# Patient Record
Sex: FEMALE | Race: BLACK OR AFRICAN AMERICAN | NOT HISPANIC OR LATINO | Employment: FULL TIME | URBAN - METROPOLITAN AREA
[De-identification: names, ages, dates, MRNs, and addresses within clinical notes are randomized per-mention and may not be internally consistent; named-entity substitution may affect disease eponyms.]

---

## 2023-08-18 ENCOUNTER — OFFICE VISIT (OUTPATIENT)
Dept: FAMILY MEDICINE | Facility: CLINIC | Age: 43
End: 2023-08-18
Payer: COMMERCIAL

## 2023-08-18 ENCOUNTER — TELEPHONE (OUTPATIENT)
Dept: FAMILY MEDICINE | Facility: CLINIC | Age: 43
End: 2023-08-18

## 2023-08-18 VITALS
TEMPERATURE: 98.2 F | SYSTOLIC BLOOD PRESSURE: 110 MMHG | WEIGHT: 210.6 LBS | HEART RATE: 92 BPM | HEIGHT: 63 IN | OXYGEN SATURATION: 100 % | RESPIRATION RATE: 16 BRPM | BODY MASS INDEX: 37.32 KG/M2 | DIASTOLIC BLOOD PRESSURE: 70 MMHG

## 2023-08-18 DIAGNOSIS — E66.01 MORBID OBESITY (H): ICD-10-CM

## 2023-08-18 DIAGNOSIS — Z13.1 SCREENING FOR DIABETES MELLITUS: ICD-10-CM

## 2023-08-18 DIAGNOSIS — J35.8 TONSIL STONE: ICD-10-CM

## 2023-08-18 DIAGNOSIS — G43.809 OTHER MIGRAINE WITHOUT STATUS MIGRAINOSUS, NOT INTRACTABLE: ICD-10-CM

## 2023-08-18 DIAGNOSIS — Z11.59 NEED FOR HEPATITIS C SCREENING TEST: ICD-10-CM

## 2023-08-18 DIAGNOSIS — Z12.31 VISIT FOR SCREENING MAMMOGRAM: ICD-10-CM

## 2023-08-18 DIAGNOSIS — Z00.00 ROUTINE HISTORY AND PHYSICAL EXAMINATION OF ADULT: Primary | ICD-10-CM

## 2023-08-18 DIAGNOSIS — Z11.4 SCREENING FOR HIV (HUMAN IMMUNODEFICIENCY VIRUS): ICD-10-CM

## 2023-08-18 DIAGNOSIS — Z12.4 CERVICAL CANCER SCREENING: ICD-10-CM

## 2023-08-18 LAB
ALBUMIN SERPL BCG-MCNC: 4.5 G/DL (ref 3.5–5.2)
ALP SERPL-CCNC: 72 U/L (ref 35–104)
ALT SERPL W P-5'-P-CCNC: 18 U/L (ref 0–50)
ANION GAP SERPL CALCULATED.3IONS-SCNC: 10 MMOL/L (ref 7–15)
AST SERPL W P-5'-P-CCNC: 18 U/L (ref 0–45)
BILIRUB SERPL-MCNC: 0.3 MG/DL
BUN SERPL-MCNC: 15.9 MG/DL (ref 6–20)
CALCIUM SERPL-MCNC: 9.4 MG/DL (ref 8.6–10)
CHLORIDE SERPL-SCNC: 107 MMOL/L (ref 98–107)
CHOLEST SERPL-MCNC: 205 MG/DL
CREAT SERPL-MCNC: 0.83 MG/DL (ref 0.51–0.95)
DEPRECATED HCO3 PLAS-SCNC: 22 MMOL/L (ref 22–29)
ERYTHROCYTE [DISTWIDTH] IN BLOOD BY AUTOMATED COUNT: 11.9 % (ref 10–15)
GFR SERPL CREATININE-BSD FRML MDRD: 90 ML/MIN/1.73M2
GLUCOSE SERPL-MCNC: 99 MG/DL (ref 70–99)
HBA1C MFR BLD: 5.3 % (ref 0–5.6)
HCT VFR BLD AUTO: 36.2 % (ref 35–47)
HDLC SERPL-MCNC: 72 MG/DL
HGB BLD-MCNC: 12.3 G/DL (ref 11.7–15.7)
LDLC SERPL CALC-MCNC: 113 MG/DL
MCH RBC QN AUTO: 33.2 PG (ref 26.5–33)
MCHC RBC AUTO-ENTMCNC: 34 G/DL (ref 31.5–36.5)
MCV RBC AUTO: 98 FL (ref 78–100)
NONHDLC SERPL-MCNC: 133 MG/DL
PLATELET # BLD AUTO: 260 10E3/UL (ref 150–450)
POTASSIUM SERPL-SCNC: 3.8 MMOL/L (ref 3.4–5.3)
PROT SERPL-MCNC: 6.9 G/DL (ref 6.4–8.3)
RBC # BLD AUTO: 3.71 10E6/UL (ref 3.8–5.2)
SODIUM SERPL-SCNC: 139 MMOL/L (ref 136–145)
TRIGL SERPL-MCNC: 98 MG/DL
TSH SERPL DL<=0.005 MIU/L-ACNC: 0.73 UIU/ML (ref 0.3–4.2)
WBC # BLD AUTO: 9.3 10E3/UL (ref 4–11)

## 2023-08-18 PROCEDURE — 80053 COMPREHEN METABOLIC PANEL: CPT | Performed by: FAMILY MEDICINE

## 2023-08-18 PROCEDURE — 87624 HPV HI-RISK TYP POOLED RSLT: CPT | Performed by: FAMILY MEDICINE

## 2023-08-18 PROCEDURE — 90715 TDAP VACCINE 7 YRS/> IM: CPT | Performed by: FAMILY MEDICINE

## 2023-08-18 PROCEDURE — 36415 COLL VENOUS BLD VENIPUNCTURE: CPT | Performed by: FAMILY MEDICINE

## 2023-08-18 PROCEDURE — 99214 OFFICE O/P EST MOD 30 MIN: CPT | Mod: 25 | Performed by: FAMILY MEDICINE

## 2023-08-18 PROCEDURE — 80061 LIPID PANEL: CPT | Performed by: FAMILY MEDICINE

## 2023-08-18 PROCEDURE — 84443 ASSAY THYROID STIM HORMONE: CPT | Performed by: FAMILY MEDICINE

## 2023-08-18 PROCEDURE — 87389 HIV-1 AG W/HIV-1&-2 AB AG IA: CPT | Performed by: FAMILY MEDICINE

## 2023-08-18 PROCEDURE — 83036 HEMOGLOBIN GLYCOSYLATED A1C: CPT | Performed by: FAMILY MEDICINE

## 2023-08-18 PROCEDURE — 90471 IMMUNIZATION ADMIN: CPT | Performed by: FAMILY MEDICINE

## 2023-08-18 PROCEDURE — 85027 COMPLETE CBC AUTOMATED: CPT | Performed by: FAMILY MEDICINE

## 2023-08-18 PROCEDURE — 99386 PREV VISIT NEW AGE 40-64: CPT | Mod: 25 | Performed by: FAMILY MEDICINE

## 2023-08-18 PROCEDURE — 86803 HEPATITIS C AB TEST: CPT | Performed by: FAMILY MEDICINE

## 2023-08-18 PROCEDURE — G0145 SCR C/V CYTO,THINLAYER,RESCR: HCPCS | Performed by: FAMILY MEDICINE

## 2023-08-18 RX ORDER — SEMAGLUTIDE 0.25 MG/.5ML
0.25 INJECTION, SOLUTION SUBCUTANEOUS WEEKLY
Qty: 2 ML | Refills: 0 | Status: SHIPPED | OUTPATIENT
Start: 2023-08-18 | End: 2023-09-15

## 2023-08-18 RX ORDER — SEMAGLUTIDE 1 MG/.5ML
1 INJECTION, SOLUTION SUBCUTANEOUS WEEKLY
Qty: 6 ML | Refills: 3 | Status: SHIPPED | OUTPATIENT
Start: 2023-10-17 | End: 2023-11-30

## 2023-08-18 RX ORDER — SUMATRIPTAN 25 MG/1
25 TABLET, FILM COATED ORAL
Qty: 30 TABLET | Refills: 1 | Status: SHIPPED | OUTPATIENT
Start: 2023-08-18 | End: 2024-05-20

## 2023-08-18 RX ORDER — SEMAGLUTIDE 0.5 MG/.5ML
0.5 INJECTION, SOLUTION SUBCUTANEOUS WEEKLY
Qty: 2 ML | Refills: 0 | Status: SHIPPED | OUTPATIENT
Start: 2023-09-15 | End: 2023-10-13

## 2023-08-18 RX ORDER — TOPIRAMATE 25 MG/1
25 TABLET, FILM COATED ORAL DAILY
Qty: 60 TABLET | Refills: 1 | Status: SHIPPED | OUTPATIENT
Start: 2023-08-18 | End: 2023-11-30

## 2023-08-18 ASSESSMENT — ENCOUNTER SYMPTOMS
FEVER: 0
DIZZINESS: 0
BREAST MASS: 0
ARTHRALGIAS: 0
PALPITATIONS: 0
CONSTIPATION: 0
HEARTBURN: 0
FREQUENCY: 0
HEMATOCHEZIA: 0
PARESTHESIAS: 0
WEAKNESS: 0
SORE THROAT: 0
ABDOMINAL PAIN: 0
CHILLS: 0
HEMATURIA: 0
DYSURIA: 0
NERVOUS/ANXIOUS: 1
JOINT SWELLING: 0
NAUSEA: 0
SHORTNESS OF BREATH: 0
EYE PAIN: 0
MYALGIAS: 0
DIARRHEA: 0
HEADACHES: 1
COUGH: 0

## 2023-08-18 ASSESSMENT — PATIENT HEALTH QUESTIONNAIRE - PHQ9
SUM OF ALL RESPONSES TO PHQ QUESTIONS 1-9: 17
10. IF YOU CHECKED OFF ANY PROBLEMS, HOW DIFFICULT HAVE THESE PROBLEMS MADE IT FOR YOU TO DO YOUR WORK, TAKE CARE OF THINGS AT HOME, OR GET ALONG WITH OTHER PEOPLE: VERY DIFFICULT
SUM OF ALL RESPONSES TO PHQ QUESTIONS 1-9: 17

## 2023-08-18 ASSESSMENT — PAIN SCALES - GENERAL: PAINLEVEL: NO PAIN (0)

## 2023-08-18 NOTE — PROGRESS NOTES
SUBJECTIVE:   CC: John is an 43 year old who presents for preventive health visit.       8/18/2023     1:05 PM   Additional Questions   Roomed by Milena Walker MA   Accompanied by self         8/18/2023     1:05 PM   Patient Reported Additional Medications   Patient reports taking the following new medications none       Healthy Habits:     Getting at least 3 servings of Calcium per day:  Yes    Bi-annual eye exam:  NO    Dental care twice a year:  Yes    Sleep apnea or symptoms of sleep apnea:  Daytime drowsiness, Excessive snoring and Sleep apnea    Diet:  Low salt, Low fat/cholesterol and Carbohydrate counting    Frequency of exercise:  2-3 days/week    Duration of exercise:  45-60 minutes    Taking medications regularly:  Yes    Medication side effects:  None    Additional concerns today:  Yes    ENT referral: Has a history of tonsil stones.      Today's PHQ-9 Score:       8/18/2023    10:17 AM   PHQ-9 SCORE   PHQ-9 Total Score MyChart 17 (Moderately severe depression)   PHQ-9 Total Score 17         Have you ever done Advance Care Planning? (For example, a Health Directive, POLST, or a discussion with a medical provider or your loved ones about your wishes): No, advance care planning information given to patient to review.  Advanced care planning was discussed at today's visit.    Social History     Tobacco Use    Smoking status: Never    Smokeless tobacco: Never   Substance Use Topics    Alcohol use: Yes     Comment: In a month, I might have drinks anywhere from 0 to 4 times.             8/18/2023    10:15 AM   Alcohol Use   Prescreen: >3 drinks/day or >7 drinks/week? No          No data to display              Reviewed orders with patient.  Reviewed health maintenance and updated orders accordingly - Yes  Lab work is in process  Labs reviewed in EPIC  BP Readings from Last 3 Encounters:   08/18/23 110/70    Wt Readings from Last 3 Encounters:   08/18/23 95.5 kg (210 lb 9.6 oz)                  There  is no problem list on file for this patient.    No past surgical history on file.    Social History     Tobacco Use    Smoking status: Never    Smokeless tobacco: Never   Substance Use Topics    Alcohol use: Yes     Comment: In a month, I might have drinks anywhere from 0 to 4 times.     Family History   Problem Relation Age of Onset    Other Cancer Mother         Skin cancer    Depression Mother     Anxiety Disorder Mother     Mental Illness Mother     Diabetes Father     Hypertension Father     Depression Father     Anxiety Disorder Father     Mental Illness Father     Obesity Father     Breast Cancer Maternal Grandmother     Mental Illness Maternal Grandmother     Diabetes Paternal Grandmother     Hypertension Paternal Grandmother     Anxiety Disorder Paternal Grandmother     Obesity Paternal Grandmother     Depression Brother     Anxiety Disorder Brother     Mental Illness Brother          No current outpatient medications on file.     Allergies   Allergen Reactions    Seasonal Allergies Difficulty breathing and Other (See Comments)     No lab results found.     Breast Cancer Screenin/18/2023    10:24 AM   Breast CA Risk Assessment (FHS-7)   Do you have a family history of breast, colon, or ovarian cancer? No / Unknown       click delete button to remove this line now    Pertinent mammograms are reviewed under the imaging tab.    History of Pap smear: Last 3 Pap Results: No results found for: PAP     Reviewed and updated as needed this visit by clinical staff   Tobacco  Allergies  Meds              Reviewed and updated as needed this visit by Provider                     Review of Systems   Constitutional:  Negative for chills and fever.   HENT:  Negative for congestion, ear pain, hearing loss and sore throat.    Eyes:  Negative for pain and visual disturbance.   Respiratory:  Negative for cough and shortness of breath.    Cardiovascular:  Negative for chest pain, palpitations and peripheral edema.  "  Gastrointestinal:  Negative for abdominal pain, constipation, diarrhea, heartburn, hematochezia and nausea.   Endocrine: Negative for cold intolerance and heat intolerance.   Breasts:  Negative for tenderness, breast mass and discharge.   Genitourinary:  Negative for dysuria, frequency, genital sores, hematuria, pelvic pain, urgency, vaginal bleeding and vaginal discharge.   Musculoskeletal:  Negative for arthralgias, joint swelling and myalgias.   Skin:  Negative for rash.   Allergic/Immunologic: Negative for environmental allergies and food allergies.   Neurological:  Positive for headaches. Negative for dizziness, weakness and paresthesias.   Psychiatric/Behavioral:  Positive for mood changes. The patient is nervous/anxious.           OBJECTIVE:   /70 (BP Location: Right arm, Patient Position: Sitting, Cuff Size: Adult Large)   Pulse 92   Temp 98.2  F (36.8  C) (Tympanic)   Resp 16   Ht 1.594 m (5' 2.75\")   Wt 95.5 kg (210 lb 9.6 oz)   LMP 08/08/2023 (Exact Date)   SpO2 100%   Breastfeeding Unknown   BMI 37.60 kg/m    Physical Exam  GENERAL: healthy, alert, and no distress  EYES: Eyes grossly normal to inspection, PERRL and conjunctivae and sclerae normal  HENT: normal cephalic/atraumatic, nose and mouth without ulcers or lesions, oropharynx clear, and oral mucous membranes moist  NECK: no adenopathy, no asymmetry, masses, or scars and thyroid normal to palpation  RESP: lungs clear to auscultation - no rales, rhonchi or wheezes  CV: regular rate and rhythm, normal S1 S2, no S3 or S4, no murmur, click or rub, no peripheral edema and peripheral pulses strong  ABDOMEN: soft, nontender, no hepatosplenomegaly, no masses and bowel sounds normal   (female): normal female external genitalia, normal urethral meatus, vaginal mucosa, normal cervix/adnexa/uterus without masses or discharge  MS: no gross musculoskeletal defects noted, no edema  SKIN: no suspicious lesions or rashes  NEURO: Normal strength " and tone, mentation intact and speech normal      ASSESSMENT/PLAN:   (Z00.00) Routine history and physical examination of adult  (primary encounter diagnosis)  Comment: Known to have migraine, tension headache, having more flareups in the recent past.  Management options discussed.  Topiramate and Imitrex prescribed.  Recommended regular exercise, healthy diet and weight loss.  Patient would like to try Wegovy for weight loss, common side effects discussed.  Follow-up in 3 months or earlier if needed  Plan: Lipid panel            (Z11.4) Screening for HIV (human immunodeficiency virus)  Comment:   Plan: HIV Antigen Antibody Combo            (Z11.59) Need for hepatitis C screening test  Comment:   Plan: Hepatitis C Screen Reflex to HCV RNA Quant and         Genotype            (Z12.4) Cervical cancer screening  Comment:   Plan: Pap Screen with HPV - recommended age 30 - 65         years           (G43.809) Other migraine without status migrainosus, not intractable  Comment: As above  Plan: CBC with platelets, Comprehensive metabolic         panel (BMP + Alb, Alk Phos, ALT, AST, Total.         Bili, TP), TSH with free T4 reflex, topiramate         (TOPAMAX) 25 MG tablet, SUMAtriptan (IMITREX)         25 MG tablet            (Z12.31) Visit for screening mammogram  Comment:   Plan: *MA Screening Digital Bilateral            (Z13.1) Screening for diabetes mellitus  Comment:   Plan: Hemoglobin A1c            (J35.8) Tonsil stone  Comment:   Plan: Adult ENT  Referral            (E66.01) Morbid obesity (H)  Comment: Healthy lifestyle modifications stressed including regular exercise, balanced diet, weight loss and limiting salt/caffeine/pop intake.  Wegovy prescribed  Plan: Semaglutide-Weight Management (WEGOVY) 0.25         MG/0.5ML pen, Semaglutide-Weight Management         (WEGOVY) 0.5 MG/0.5ML pen, Semaglutide-Weight         Management (WEGOVY) 1 MG/0.5ML pen, insulin pen        needle (32G X 4 MM) 32G X 4 MM  "miscellaneous            Depression Screening Follow Up        8/18/2023    10:17 AM   PHQ   PHQ-9 Total Score 17   Q9: Thoughts of better off dead/self-harm past 2 weeks Not at all       Follow Up Actions Taken  Crisis resource information provided in After Visit Summary       COUNSELING:  Reviewed preventive health counseling, as reflected in patient instructions      BMI:   Estimated body mass index is 37.6 kg/m  as calculated from the following:    Height as of this encounter: 1.594 m (5' 2.75\").    Weight as of this encounter: 95.5 kg (210 lb 9.6 oz).   Weight management plan: Discussed healthy diet and exercise guidelines      She reports that she has never smoked. She has never used smokeless tobacco.              Vikram Perrin MD  [facility]  "

## 2023-08-18 NOTE — TELEPHONE ENCOUNTER
.Prior Authorization Retail Medication Request    Medication/Dose: Wegovy 0.25mg/0.5ml    ICD code (if different than what is on RX):    Previously Tried and Failed:    Rationale:      Insurance Name: [employer]   Insurance ID: [id number]       Pharmacy Information (if different than what is on RX)  Name:    Phone:     Thank You,   Freda Narayanan [facility] [place]

## 2023-08-19 LAB
HCV AB SERPL QL IA: NONREACTIVE
HIV 1+2 AB+HIV1 P24 AG SERPL QL IA: NONREACTIVE

## 2023-08-22 NOTE — TELEPHONE ENCOUNTER
Prior Authorization Approval    Medication: WEGOVY 0.25 MG/0.5ML SC SOAJ  Authorization Effective Date: 8/22/2023  Authorization Expiration Date: 3/19/2024  Approved Dose/Quantity: 2/28  Reference #: [id number]   Insurance Company: [employer] - Phone [phone] Fax [phone]  Expected CoPay:       CoPay Card Available:      Financial Assistance Needed:   Which Pharmacy is filling the prescription: [place], [place] - [address] [place] [address]  Pharmacy Notified: Yes  Patient Notified: Yes

## 2023-08-22 NOTE — TELEPHONE ENCOUNTER
PA Initiation    Medication: WEGOVY 0.25 MG/0.5ML SC SOAJ  Insurance Company: [employer] - Phone [phone] Fax [phone]  Pharmacy Filling the Rx: [place], [place] - [address] [place] [address]  Filling Pharmacy Phone: [phone]  Filling Pharmacy Fax:    Start Date: 8/22/2023

## 2023-08-23 LAB
BKR LAB AP GYN ADEQUACY: NORMAL
BKR LAB AP GYN INTERPRETATION: NORMAL
BKR LAB AP HPV REFLEX: NORMAL
BKR LAB AP PREVIOUS ABNORMAL: NORMAL
PATH REPORT.COMMENTS IMP SPEC: NORMAL
PATH REPORT.COMMENTS IMP SPEC: NORMAL
PATH REPORT.RELEVANT HX SPEC: NORMAL

## 2023-08-25 LAB
HUMAN PAPILLOMA VIRUS 16 DNA: NEGATIVE
HUMAN PAPILLOMA VIRUS 18 DNA: NEGATIVE
HUMAN PAPILLOMA VIRUS FINAL DIAGNOSIS: NORMAL
HUMAN PAPILLOMA VIRUS OTHER HR: NEGATIVE

## 2023-11-30 ENCOUNTER — E-VISIT (OUTPATIENT)
Dept: FAMILY MEDICINE | Facility: CLINIC | Age: 43
End: 2023-11-30
Payer: COMMERCIAL

## 2023-11-30 DIAGNOSIS — G43.809 OTHER MIGRAINE WITHOUT STATUS MIGRAINOSUS, NOT INTRACTABLE: ICD-10-CM

## 2023-11-30 DIAGNOSIS — E66.01 MORBID OBESITY (H): Primary | ICD-10-CM

## 2023-11-30 PROCEDURE — 99421 OL DIG E/M SVC 5-10 MIN: CPT | Performed by: FAMILY MEDICINE

## 2023-12-01 ENCOUNTER — ALLIED HEALTH/NURSE VISIT (OUTPATIENT)
Dept: FAMILY MEDICINE | Facility: CLINIC | Age: 43
End: 2023-12-01
Payer: COMMERCIAL

## 2023-12-01 DIAGNOSIS — Z23 NEED FOR PROPHYLACTIC VACCINATION AND INOCULATION AGAINST INFLUENZA: Primary | ICD-10-CM

## 2023-12-01 PROCEDURE — 90686 IIV4 VACC NO PRSV 0.5 ML IM: CPT

## 2023-12-01 PROCEDURE — 99207 PR NO CHARGE NURSE ONLY: CPT

## 2023-12-01 PROCEDURE — 90715 TDAP VACCINE 7 YRS/> IM: CPT

## 2023-12-01 PROCEDURE — 90471 IMMUNIZATION ADMIN: CPT

## 2023-12-01 PROCEDURE — 90472 IMMUNIZATION ADMIN EACH ADD: CPT

## 2023-12-01 NOTE — PROGRESS NOTES
Prior to immunization administration, verified patients identity using patient s name and date of birth. Please see Immunization Activity for additional information.     Screening Questionnaire for Adult Immunization    Are you sick today?   No   Do you have allergies to medications, food, a vaccine component or latex?   No   Have you ever had a serious reaction after receiving a vaccination?   No   Do you have a long-term health problem with heart, lung, kidney, or metabolic disease (e.g., diabetes), asthma, a blood disorder, no spleen, complement component deficiency, a cochlear implant, or a spinal fluid leak?  Are you on long-term aspirin therapy?   No   Do you have cancer, leukemia, HIV/AIDS, or any other immune system problem?   No   Do you have a parent, brother, or sister with an immune system problem?   No   In the past 3 months, have you taken medications that affect  your immune system, such as prednisone, other steroids, or anticancer drugs; drugs for the treatment of rheumatoid arthritis, Crohn s disease, or psoriasis; or have you had radiation treatments?   No   Have you had a seizure, or a brain or other nervous system problem?   No   During the past year, have you received a transfusion of blood or blood    products, or been given immune (gamma) globulin or antiviral drug?   No   For women: Are you pregnant or is there a chance you could become       pregnant during the next month?   No   Have you received any vaccinations in the past 4 weeks?   No     Immunization questionnaire answers were all negative.    I have reviewed the following standing orders:   This patient is due and qualifies for the Influenza vaccine.    Click here for Influenza Vaccine Standing Order    I have reviewed the vaccines inclusion and exclusion criteria; No concerns regarding eligibility.         This patient is due and qualifies for a TDAP vaccine.    Click here for Tdap Standing Order    I have reviewed the vaccines  inclusion and exclusion criteria; No concerns regarding eligibility.     Patient instructed to remain in clinic for 15 minutes afterwards, and to report any adverse reactions.     Screening performed by Delma Pink on 12/1/2023 at 3:59 PM.

## 2023-12-19 ENCOUNTER — VIRTUAL VISIT (OUTPATIENT)
Dept: FAMILY MEDICINE | Facility: CLINIC | Age: 43
End: 2023-12-19
Payer: COMMERCIAL

## 2023-12-19 DIAGNOSIS — G43.809 OTHER MIGRAINE WITHOUT STATUS MIGRAINOSUS, NOT INTRACTABLE: Primary | ICD-10-CM

## 2023-12-19 DIAGNOSIS — E66.01 MORBID OBESITY (H): ICD-10-CM

## 2023-12-19 PROCEDURE — 99214 OFFICE O/P EST MOD 30 MIN: CPT | Mod: VID | Performed by: FAMILY MEDICINE

## 2023-12-19 RX ORDER — METOPROLOL TARTRATE 25 MG/1
25 TABLET, FILM COATED ORAL 2 TIMES DAILY
Qty: 120 TABLET | Refills: 1 | Status: SHIPPED | OUTPATIENT
Start: 2023-12-19 | End: 2024-09-27

## 2023-12-19 NOTE — PROGRESS NOTES
"John is a 43 year old who is being evaluated via a billable video visit.      How would you like to obtain your AVS? MyChart  If the video visit is dropped, the invitation should be resent by: Text to cell phone: [phone]  Will anyone else be joining your video visit? No        Assessment & Plan     Morbid obesity (H)  Patient lost almost 27 pounds weight since started Wegovy.  Recommended to continue regular exercise, healthy diet and Wegovy prescription refilled  - Semaglutide-Weight Management (WEGOVY) 1.7 MG/0.75ML pen; Inject 1.7 mg Subcutaneous once a week    Other migraine without status migrainosus, not intractable  Patient stopped taking topiramate due to side effects.  Alternative options discussed.  Metoprolol prescribed and recommended to schedule appointment with neurologist as well  - metoprolol tartrate (LOPRESSOR) 25 MG tablet; Take 1 tablet (25 mg) by mouth 2 times daily      Vikram Perrin MD  [facility]    Subjective   John is a 43 year old, presenting for the following health issues:  Migraine      History of Present Illness       Headaches:   Since the patient's last clinic visit, headaches are: worsened  The patient is getting headaches:  15-18 days out of the month  She is not able to do normal daily activities when she has a migraine.  The patient is taking the following rescue/relief medications:  Tylenol, Excedrin and sumatriptan (Imitrex)   Patient states \"I get some relief\" from the rescue/relief medications.   The patient is taking the following medications to prevent migraines:  No medications to prevent migraines  In the past 4 weeks, the patient has gone to an Urgent Care or Emergency Room 1 time times due to headaches.    Reason for visit:  Follow up from August 2023 visit, where I started 3 new medications    She eats 2-3 servings of fruits and vegetables daily.She consumes 2 sweetened beverage(s) daily.She exercises with enough effort to " increase her heart rate 30 to 60 minutes per day.  She exercises with enough effort to increase her heart rate 4 days per week.   She is taking medications regularly.         Review of Systems   Constitutional, HEENT, cardiovascular, pulmonary, GI, , musculoskeletal, neuro, skin, endocrine and psych systems are negative, except as otherwise noted.      Objective           Vitals:  No vitals were obtained today due to virtual visit.    Physical Exam   GENERAL: Healthy, alert and no distress  EYES: Eyes grossly normal to inspection.  No discharge or erythema, or obvious scleral/conjunctival abnormalities.  RESP: No audible wheeze, cough, or visible cyanosis.  No visible retractions or increased work of breathing.    SKIN: Visible skin clear. No significant rash, abnormal pigmentation or lesions.  NEURO: Cranial nerves grossly intact.  Mentation and speech appropriate for age.  PSYCH: Mentation appears normal, affect normal/bright, judgement and insight intact, normal speech and appearance well-groomed.    Wt Readings from Last 10 Encounters:   08/18/23 95.5 kg (210 lb 9.6 oz)            Video-Visit Details    Type of service:  Video Visit     Originating Location (pt. Location): Home    Distant Location (provider location):  On-site  Platform used for Video Visit: [employer]

## 2023-12-19 NOTE — COMMUNITY RESOURCES LIST (ENGLISH)
12/19/2023   [facility] Guest of a Guest  N/A  For questions about this resource list or additional care needs, please contact your primary care clinic or care manager.  Phone: [phone]   Email: N/A   Address: [address] [place] [address]   Hours: N/A        Financial Stability       Rent and mortgage payment assistance  1  [place] [employer] COVID-19 Housing Assistance - Housing Help Distance: 0.13 miles      Phone/Virtual   [address] [place] [address]  Language: English  Hours: Mon - Sun Open 24 Hours  Fees: Free   Phone: (481) 472-3477 Email: info@housinglink.org Website: https://housinghelpmn.org/     2  [employer] - [place] Distance: 0.13 miles      Phone/Virtual   [address] [place] [address]  Language: English  Hours: Mon - Thu 9:00 AM - 5:00 PM  Fees: Free   Phone: (904) 796-2971 Email: general.questions@[employer] Website: https://www.[employer]     Utility payment assistance  3  [employer] - [place] Distance: 0.13 miles      Phone/Virtual   [address] [place] [address]  Language: English  Hours: Mon - Thu 9:00 AM - 5:00 PM  Fees: Free   Phone: (133) 920-2457 Email: general.questions@[employer] Website: https://www.[employer]     4  [employer] - Payment Plan Credit Program Distance: 0.28 miles      Phone/Virtual   [address] [place] [address]  Language: English, Malawian  Hours: Mon - Fri 7:00 AM - 7:00 PM  Fees: Free   Phone: (477) 824-8435 Email: inquire@[employer] Website: https://www.[employer]/          Important Numbers & Websites       Emergency Services   911  City Services   311  Poison Control   (647) 567-5490  Suicide Prevention Lifeline   (563) 836-6351 (TALK)  Child Abuse Hotline   (395) 574-9661 (4-A-Child)  Sexual Assault Hotline   (172) 461-2398 (HOPE)  National Runaway Safeline   (485) 173-6477 (RUNAWAY)  All-Options Talkline   (344) 657-1867  Substance Abuse Referral   (893) 281-6202 (HELP)     DISPLAY PLAN FREE TEXT

## 2023-12-27 ENCOUNTER — OFFICE VISIT (OUTPATIENT)
Dept: OTOLARYNGOLOGY | Facility: CLINIC | Age: 43
End: 2023-12-27
Payer: COMMERCIAL

## 2023-12-27 VITALS — DIASTOLIC BLOOD PRESSURE: 84 MMHG | HEART RATE: 87 BPM | SYSTOLIC BLOOD PRESSURE: 129 MMHG

## 2023-12-27 DIAGNOSIS — J35.8 TONSIL STONE: ICD-10-CM

## 2023-12-27 DIAGNOSIS — J35.01 CHRONIC TONSILLITIS: Primary | ICD-10-CM

## 2023-12-27 PROCEDURE — 99204 OFFICE O/P NEW MOD 45 MIN: CPT | Performed by: OTOLARYNGOLOGY

## 2023-12-27 RX ORDER — LANOLIN ALCOHOL/MO/W.PET/CERES
3 CREAM (GRAM) TOPICAL
COMMUNITY

## 2023-12-27 RX ORDER — VITAMIN B COMPLEX
TABLET ORAL DAILY
COMMUNITY

## 2023-12-27 ASSESSMENT — ENCOUNTER SYMPTOMS
STRIDOR: 0
GASTROINTESTINAL NEGATIVE: 1
CONSTITUTIONAL NEGATIVE: 1
TINGLING: 0
DIZZINESS: 0
SORE THROAT: 1
BRUISES/BLEEDS EASILY: 0
HEADACHES: 0
RESPIRATORY NEGATIVE: 1
EYES NEGATIVE: 1
SINUS PAIN: 0

## 2023-12-27 NOTE — PROGRESS NOTES
John Preciado's chief complaint for this visit includes:  Chief Complaint   Patient presents with    Consult     Tonsil stones for several years. With bad breath     PCP: Vikram Perrin    Referring Provider:  Vikram Perrin [place]  [address] [place] [address]    /84   Pulse 87   LMP  (LMP Unknown)

## 2023-12-27 NOTE — LETTER
12/27/2023         RE: John Preciado  [address]  [place] [address]        Dear Colleague,    Thank you for referring your patient, John Preciado, to the [facility]. Please see a copy of my visit note below.    John Preciado's chief complaint for this visit includes:  Chief Complaint   Patient presents with     Consult     Tonsil stones for several years. With bad breath     PCP: Vikram Perrin    Referring Provider:  Vikram Perrin MD  [address] [place] [address]    /84   Pulse 87   LMP  (LMP Unknown)             HPI    This pleasant 42 y/o patient is having tonsil stones, bad breath and smell for years. Describes post nasal drainage. Denies any fever, dysphagia, odynophagia, or voice changes. Describes sore throat from time to time. She has a hx of recurrent tonsillitis during her childhood. No known environmental allergies.    Current Medications:  insulin pen needle (32G X 4 MM) 32G X 4 MM miscellaneous  melatonin 3 MG tablet  metoprolol tartrate (LOPRESSOR) 25 MG tablet  Semaglutide-Weight Management (WEGOVY) 1.7 MG/0.75ML pen  SUMAtriptan (IMITREX) 25 MG tablet  Vitamin D3 (VITAMIN D, CHOLECALCIFEROL,) 25 mcg (1000 units) tablet         Review of Systems   Constitutional: Negative.    HENT:  Positive for sore throat. Negative for ear discharge, ear pain, hearing loss, nosebleeds, sinus pain and tinnitus.    Eyes: Negative.    Respiratory: Negative.  Negative for stridor.    Gastrointestinal: Negative.    Skin: Negative.    Neurological:  Negative for dizziness, tingling and headaches.   Endo/Heme/Allergies:  Positive for environmental allergies. Does not bruise/bleed easily.         Physical Exam  Vitals and nursing note reviewed.   Constitutional:       Appearance: Normal appearance.   HENT:      Head: Normocephalic and atraumatic.      Right Ear: Tympanic membrane, ear canal and external ear normal. No middle ear  effusion.      Left Ear: Tympanic membrane, ear canal and external ear normal.  No middle ear effusion.      Nose: Nose normal. No mucosal edema, congestion or rhinorrhea.      Mouth/Throat:      Mouth: Mucous membranes are moist.      Pharynx: Oropharynx is clear. Uvula midline. No uvula swelling.      Tonsils: No tonsillar abscesses. 2+ on the right. 2+ on the left.   Eyes:      Extraocular Movements: Extraocular movements intact.      Pupils: Pupils are equal, round, and reactive to light.   Neurological:      Mental Status: She is alert.         A/P  42 y/o with bilateral tonsil stones and bad breath and smell is seen. Options including mouth washes, pharyngeal gargles, bilateral tonsillectomy were discussed. Pros, cons, alternatives and complications were discussed. Patient's questions were answered. Arrangements will be done according to her wishes.      Again, thank you for allowing me to participate in the care of your patient.        Sincerely,        Sara Chapman MD

## 2023-12-27 NOTE — NURSING NOTE
John Preciado's chief complaint for this visit includes:  Chief Complaint   Patient presents with    Consult     Tonsil stones for several years. With bad breath     PCP: Vikram Perrin    Referring Provider:  Vikram Perrin MD  [address] [place] [address]    /84   Pulse 87   LMP  (LMP Unknown)

## 2023-12-27 NOTE — PROGRESS NOTES
HPI    This pleasant 44 y/o patient is having tonsil stones, bad breath and smell for years. Describes post nasal drainage. Denies any fever, dysphagia, odynophagia, or voice changes. Describes sore throat from time to time. She has a hx of recurrent tonsillitis during her childhood. No known environmental allergies.    Current Medications:  insulin pen needle (32G X 4 MM) 32G X 4 MM miscellaneous  melatonin 3 MG tablet  metoprolol tartrate (LOPRESSOR) 25 MG tablet  Semaglutide-Weight Management (WEGOVY) 1.7 MG/0.75ML pen  SUMAtriptan (IMITREX) 25 MG tablet  Vitamin D3 (VITAMIN D, CHOLECALCIFEROL,) 25 mcg (1000 units) tablet         Review of Systems   Constitutional: Negative.    HENT:  Positive for sore throat. Negative for ear discharge, ear pain, hearing loss, nosebleeds, sinus pain and tinnitus.    Eyes: Negative.    Respiratory: Negative.  Negative for stridor.    Gastrointestinal: Negative.    Skin: Negative.    Neurological:  Negative for dizziness, tingling and headaches.   Endo/Heme/Allergies:  Positive for environmental allergies. Does not bruise/bleed easily.         Physical Exam  Vitals and nursing note reviewed.   Constitutional:       Appearance: Normal appearance.   HENT:      Head: Normocephalic and atraumatic.      Right Ear: Tympanic membrane, ear canal and external ear normal. No middle ear effusion.      Left Ear: Tympanic membrane, ear canal and external ear normal.  No middle ear effusion.      Nose: Nose normal. No mucosal edema, congestion or rhinorrhea.      Mouth/Throat:      Mouth: Mucous membranes are moist.      Pharynx: Oropharynx is clear. Uvula midline. No uvula swelling.      Tonsils: No tonsillar abscesses. 2+ on the right. 2+ on the left.   Eyes:      Extraocular Movements: Extraocular movements intact.      Pupils: Pupils are equal, round, and reactive to light.   Neurological:      Mental Status: She is alert.         A/P  44 y/o with bilateral tonsil stones and bad breath and  smell is seen. Options including mouth washes, pharyngeal gargles, bilateral tonsillectomy were discussed. Pros, cons, alternatives and complications were discussed. Patient's questions were answered. Arrangements will be done according to her wishes.

## 2023-12-28 ENCOUNTER — TELEPHONE (OUTPATIENT)
Dept: OTOLARYNGOLOGY | Facility: CLINIC | Age: 43
End: 2023-12-28
Payer: COMMERCIAL

## 2023-12-28 NOTE — TELEPHONE ENCOUNTER
Left voicemail for patient regarding scheduling surgery with Dr. Chapman.    Provided contact number to discuss.    P: [phone]    __    Sandy Cardona, on 12/28/2023 at 1:47 PM

## 2024-01-08 NOTE — TELEPHONE ENCOUNTER
Left voicemail for patient regarding scheduling surgery with Dr. Chapman.    Provided contact number to discuss.    P: [phone]    __    Sandy Cardona, on 1/8/2024 at 12:02 PM

## 2024-01-16 NOTE — TELEPHONE ENCOUNTER
Left voicemail for patient regarding scheduling surgery with Dr. Chapman.    Provided contact number to discuss.    P: [phone]    __    Sandy Cardona, on 1/16/2024 at 1:42 PM

## 2024-02-02 ENCOUNTER — TELEPHONE (OUTPATIENT)
Dept: OTOLARYNGOLOGY | Facility: CLINIC | Age: 44
End: 2024-02-02
Payer: COMMERCIAL

## 2024-02-02 PROBLEM — J35.01 CHRONIC TONSILLITIS: Status: ACTIVE | Noted: 2023-12-27

## 2024-02-02 PROBLEM — J35.8 TONSIL STONE: Status: ACTIVE | Noted: 2023-12-27

## 2024-02-02 NOTE — TELEPHONE ENCOUNTER
Left voicemail for patient regarding scheduling surgery with Dr. Chapman.    Provided contact number to discuss.    P: [phone]    __    Sandy Cardona, on 2/2/2024 at 10:51 AM

## 2024-02-02 NOTE — TELEPHONE ENCOUNTER
Date Scheduled: 5/29/2024  Facility: Surgery Locations: [facility]  Surgeon: Dr. Sara Chapman   Post-op appointment scheduled: 6/21/2024 at 9:00am [facility]   scheduled?: No  Surgery packet/instructions confirmed received?   To be sent in the [place] mail  Pre op physical/PAC appointment: PCP - Vikram Perrin MD  Special Considerations: n/a    Sandy Cardona on 2/2/2024 at 11:34 AM

## 2024-03-16 ENCOUNTER — HEALTH MAINTENANCE LETTER (OUTPATIENT)
Age: 44
End: 2024-03-16

## 2024-03-28 ENCOUNTER — TELEPHONE (OUTPATIENT)
Dept: FAMILY MEDICINE | Facility: CLINIC | Age: 44
End: 2024-03-28
Payer: COMMERCIAL

## 2024-03-28 NOTE — TELEPHONE ENCOUNTER
Prior Authorization Retail Medication Request    Medication/Dose: Wegovy 1.7mg  Diagnosis and ICD code (if different than what is on RX):    New/renewal/insurance change PA/secondary ins. PA:  Previously Tried and Failed:    Rationale:      Insurance   Primary: [employer]  Insurance ID:  [id number]  PHONE  [phone]    Secondary (if applicable):  Insurance ID:      Pharmacy Information (if different than what is on RX)  Name:    Phone:    Fax:      Thank you,  Shraddha Smith,  Pharmacy [facility]

## 2024-04-05 ENCOUNTER — MYC MEDICAL ADVICE (OUTPATIENT)
Dept: FAMILY MEDICINE | Facility: CLINIC | Age: 44
End: 2024-04-05
Payer: COMMERCIAL

## 2024-04-10 NOTE — TELEPHONE ENCOUNTER
Please provide current weight for patient.  We are unable to complete the PA until the weight is updated.  Last documented weight in chart is 08/18/2023.

## 2024-04-10 NOTE — TELEPHONE ENCOUNTER
Left message on answer machine to return call.  Pharmacy is asking for current weight.    Patsy Miller CMA  '

## 2024-04-19 ENCOUNTER — TELEPHONE (OUTPATIENT)
Dept: FAMILY MEDICINE | Facility: CLINIC | Age: 44
End: 2024-04-19
Payer: COMMERCIAL

## 2024-04-19 NOTE — TELEPHONE ENCOUNTER
Patient Returning Call    Reason for call:  patient have to report her weight     Information relayed to patient:  n/a    Patient has additional questions:  Yes    What are your questions/concerns:  yes medication about beckygovy is not cover by ins    Who does the patient want to speak with:      Is an  needed?:  No      Could we send this information to you in [employer] or would you prefer to receive a phone call?:   Patient would prefer a phone call   Okay to leave a detailed message?: Yes at Cell number on file:    Telephone Information:   Mobile [phone]

## 2024-04-19 NOTE — TELEPHONE ENCOUNTER
Patient called back and reports her current weight today is 162.5 #, this is entered under patient reported vitals.    Routing to PA pool for review.      ANITHA Deleon

## 2024-04-19 NOTE — TELEPHONE ENCOUNTER
Retail Pharmacy Prior Authorization Team   Phone: [phone]    PA Initiation    Medication: WEGOVY 1.7 MG/0.75ML SC SOAJ  Insurance Company: [employer] - Phone [phone] Fax [phone]  Pharmacy Filling the Rx: [facility] [place], [place] - [address]  Filling Pharmacy Phone: [phone]  Filling Pharmacy Fax:    Start Date: 4/10/2024      Note: Due to record-high volumes, our turn-around time is taking longer than usual . We are currently 10 business days behind in the pools.   We are working diligently to submit all requests in a timely manner and in the order they are received. Please only flag TRUE URGENT requests as high priority to the pool at this time.   If you have questions - please send a note/message in the active PA encounter and send back to the [facility] PA pool [[phone]].    If you have more specific questions about our process please reach out to our supervisor Lyn Bassett.   Thank you!   RPPA (Retail Pharmacy Prior Authorization) team

## 2024-04-19 NOTE — TELEPHONE ENCOUNTER
Patient called and verified, she is reporting her current weight is 162.5 #. This is entered under patient reported vitals. She saysthat she needs her Wegovy approved, it is waiting on her reported weight to get approval.        Will also locate the PA encounter for the Wegovy and route to PA care team for review. Patient is told will call her back once more info has been determined.    Will close this encounter as this is being taken care of in a separate encounter on 3-28-24 and is routed to PA pool for review.    ANITHA Deleon

## 2024-04-22 NOTE — TELEPHONE ENCOUNTER
Prior Authorization Approval    Medication: WEGOVY 1.7 MG/0.75ML SC SOAJ  Authorization Effective Date: 4/20/2024  Authorization Expiration Date: 4/20/2025  Approved Dose/Quantity:   Reference #:     Insurance Company: [employer] - Phone [phone] Fax [phone]  Expected CoPay: $    CoPay Card Available:      Financial Assistance Needed:   Which Pharmacy is filling the prescription: [place], [place] - [address] [place] [address]  Pharmacy Notified: Yes  Patient Notified: **Instructed pharmacy to notify patient when script is ready to /ship.**

## 2024-05-19 DIAGNOSIS — E66.01 MORBID OBESITY (H): ICD-10-CM

## 2024-05-19 RX ORDER — SEMAGLUTIDE 1.7 MG/.75ML
INJECTION, SOLUTION SUBCUTANEOUS
Qty: 3 ML | Refills: 3 | Status: SHIPPED | OUTPATIENT
Start: 2024-05-19 | End: 2024-09-27

## 2024-05-20 ENCOUNTER — OFFICE VISIT (OUTPATIENT)
Dept: FAMILY MEDICINE | Facility: CLINIC | Age: 44
End: 2024-05-20
Payer: COMMERCIAL

## 2024-05-20 VITALS
TEMPERATURE: 97.6 F | BODY MASS INDEX: 27.85 KG/M2 | WEIGHT: 156 LBS | SYSTOLIC BLOOD PRESSURE: 104 MMHG | OXYGEN SATURATION: 100 % | DIASTOLIC BLOOD PRESSURE: 80 MMHG | RESPIRATION RATE: 14 BRPM

## 2024-05-20 DIAGNOSIS — F33.9 RECURRENT MAJOR DEPRESSIVE DISORDER, REMISSION STATUS UNSPECIFIED (H): ICD-10-CM

## 2024-05-20 DIAGNOSIS — J35.01 CHRONIC TONSILLITIS: ICD-10-CM

## 2024-05-20 DIAGNOSIS — E66.01 MORBID OBESITY (H): ICD-10-CM

## 2024-05-20 DIAGNOSIS — Z01.818 PREOP GENERAL PHYSICAL EXAM: Primary | ICD-10-CM

## 2024-05-20 DIAGNOSIS — G43.809 OTHER MIGRAINE WITHOUT STATUS MIGRAINOSUS, NOT INTRACTABLE: ICD-10-CM

## 2024-05-20 LAB
ANION GAP SERPL CALCULATED.3IONS-SCNC: 13 MMOL/L (ref 7–15)
BUN SERPL-MCNC: 9 MG/DL (ref 6–20)
CALCIUM SERPL-MCNC: 9.5 MG/DL (ref 8.6–10)
CHLORIDE SERPL-SCNC: 105 MMOL/L (ref 98–107)
CREAT SERPL-MCNC: 0.77 MG/DL (ref 0.51–0.95)
DEPRECATED HCO3 PLAS-SCNC: 21 MMOL/L (ref 22–29)
EGFRCR SERPLBLD CKD-EPI 2021: >90 ML/MIN/1.73M2
ERYTHROCYTE [DISTWIDTH] IN BLOOD BY AUTOMATED COUNT: 11.5 % (ref 10–15)
GLUCOSE SERPL-MCNC: 86 MG/DL (ref 70–99)
HCT VFR BLD AUTO: 34.7 % (ref 35–47)
HGB BLD-MCNC: 11.9 G/DL (ref 11.7–15.7)
MCH RBC QN AUTO: 33.4 PG (ref 26.5–33)
MCHC RBC AUTO-ENTMCNC: 34.3 G/DL (ref 31.5–36.5)
MCV RBC AUTO: 98 FL (ref 78–100)
PLATELET # BLD AUTO: 236 10E3/UL (ref 150–450)
POTASSIUM SERPL-SCNC: 3.8 MMOL/L (ref 3.4–5.3)
RBC # BLD AUTO: 3.56 10E6/UL (ref 3.8–5.2)
SODIUM SERPL-SCNC: 139 MMOL/L (ref 135–145)
WBC # BLD AUTO: 5.3 10E3/UL (ref 4–11)

## 2024-05-20 PROCEDURE — 85027 COMPLETE CBC AUTOMATED: CPT | Performed by: FAMILY MEDICINE

## 2024-05-20 PROCEDURE — 36415 COLL VENOUS BLD VENIPUNCTURE: CPT | Performed by: FAMILY MEDICINE

## 2024-05-20 PROCEDURE — 99214 OFFICE O/P EST MOD 30 MIN: CPT | Performed by: FAMILY MEDICINE

## 2024-05-20 PROCEDURE — 80048 BASIC METABOLIC PNL TOTAL CA: CPT | Performed by: FAMILY MEDICINE

## 2024-05-20 PROCEDURE — G2211 COMPLEX E/M VISIT ADD ON: HCPCS | Performed by: FAMILY MEDICINE

## 2024-05-20 RX ORDER — LORATADINE 10 MG/1
10 TABLET ORAL DAILY
COMMUNITY

## 2024-05-20 RX ORDER — SUMATRIPTAN 25 MG/1
25 TABLET, FILM COATED ORAL
Qty: 30 TABLET | Refills: 1 | Status: SHIPPED | OUTPATIENT
Start: 2024-05-20 | End: 2024-09-27

## 2024-05-20 ASSESSMENT — PAIN SCALES - GENERAL: PAINLEVEL: NO PAIN (0)

## 2024-05-20 NOTE — NURSING NOTE
"Chief Complaint   Patient presents with    Pre-Op Exam     Tonsillectomy       /80   Temp 97.6  F (36.4  C) (Tympanic)   Resp 14   Wt 70.8 kg (156 lb)   SpO2 100%   BMI 27.85 kg/m   Estimated body mass index is 27.85 kg/m  as calculated from the following:    Height as of 8/18/23: 1.594 m (5' 2.75\").    Weight as of this encounter: 70.8 kg (156 lb).  Patient presents to the clinic using No DME      Health Maintenance that is potentially due pending provider review:    Health Maintenance Due   Topic Date Due    MAMMO SCREENING  Never done    HEPATITIS B IMMUNIZATION (1 of 3 - 19+ 3-dose series) Never done    COVID-19 Vaccine (4 - 2023-24 season) 09/01/2023                  "

## 2024-05-28 ENCOUNTER — ANESTHESIA EVENT (OUTPATIENT)
Dept: SURGERY | Facility: AMBULATORY SURGERY CENTER | Age: 44
End: 2024-05-28
Payer: COMMERCIAL

## 2024-05-28 NOTE — ANESTHESIA PREPROCEDURE EVALUATION
Anesthesia Pre-Procedure Evaluation    Patient: John Preciado   MRN: [id number] : 1980        Procedure : Procedure(s):  TONSILLECTOMY          Past Medical History:   Diagnosis Date    Depressive disorder     I was prescribed Fluoxetine/Prozac, and I took it successfully for a handful of years. The only reason I stopped taking it was because  I haven't successfully been able to get in to see a Psychotherapist since before the pandemic.    Hypertension       History reviewed. No pertinent surgical history.   Allergies   Allergen Reactions    Seasonal Allergies Difficulty breathing and Other (See Comments)      Social History     Tobacco Use    Smoking status: Never     Passive exposure: Never    Smokeless tobacco: Never   Substance Use Topics    Alcohol use: Yes     Comment: In a month, I might have drinks anywhere from 0 to 4 times.      Wt Readings from Last 1 Encounters:   24 70.8 kg (156 lb)        Anesthesia Evaluation            ROS/MED HX  ENT/Pulmonary:  - neg pulmonary ROS     Neurologic:     (+)      migraines,                          Cardiovascular: Comment: Hx PVCs    (+)  hypertension- -   -  - -                                      METS/Exercise Tolerance:     Hematologic:  - neg hematologic  ROS     Musculoskeletal:  - neg musculoskeletal ROS     GI/Hepatic:  - neg GI/hepatic ROS     Renal/Genitourinary:  - neg Renal ROS     Endo:  - neg endo ROS     Psychiatric/Substance Use:     (+) psychiatric history depression       Infectious Disease:  - neg infectious disease ROS     Malignancy:       Other:            Physical Exam    Airway  airway exam normal      Mallampati: I       Respiratory Devices and Support         Dental       (+) Minor Abnormalities - some fillings, tiny chips      Cardiovascular   cardiovascular exam normal          Pulmonary   pulmonary exam normal                OUTSIDE LABS:  CBC:   Lab Results   Component Value Date    WBC 5.3 2024    WBC 9.3  "08/18/2023    HGB 11.9 05/20/2024    HGB 12.3 08/18/2023    HCT 34.7 (L) 05/20/2024    HCT 36.2 08/18/2023     05/20/2024     08/18/2023     BMP:   Lab Results   Component Value Date     05/20/2024     08/18/2023    POTASSIUM 3.8 05/20/2024    POTASSIUM 3.8 08/18/2023    CHLORIDE 105 05/20/2024    CHLORIDE 107 08/18/2023    CO2 21 (L) 05/20/2024    CO2 22 08/18/2023    BUN 9.0 05/20/2024    BUN 15.9 08/18/2023    CR 0.77 05/20/2024    CR 0.83 08/18/2023    GLC 86 05/20/2024    GLC 99 08/18/2023     COAGS: No results found for: \"PTT\", \"INR\", \"FIBR\"  POC: No results found for: \"BGM\", \"HCG\", \"HCGS\"  HEPATIC:   Lab Results   Component Value Date    ALBUMIN 4.5 08/18/2023    PROTTOTAL 6.9 08/18/2023    ALT 18 08/18/2023    AST 18 08/18/2023    ALKPHOS 72 08/18/2023    BILITOTAL 0.3 08/18/2023     OTHER:   Lab Results   Component Value Date    A1C 5.3 08/18/2023    JERAMY 9.5 05/20/2024    TSH 0.73 08/18/2023       Anesthesia Plan    ASA Status:  2    NPO Status:  NPO Appropriate    Anesthesia Type: General.     - Airway: ETT   Induction: Intravenous, Propofol.   Maintenance: Balanced.        Consents    Anesthesia Plan(s) and associated risks, benefits, and realistic alternatives discussed. Questions answered and patient/representative(s) expressed understanding.     - Discussed: Risks, Benefits and Alternatives for the PROCEDURE were discussed     - Discussed with:  Patient            Postoperative Care    Pain management: Oral pain medications, IV analgesics, Multi-modal analgesia.   PONV prophylaxis: Ondansetron (or other 5HT-3), Dexamethasone or Solumedrol, Background Propofol Infusion     Comments:               Bart Dalton MD    I have reviewed the pertinent notes and labs in the chart from the past 30 days and (re)examined the patient.  Any updates or changes from those notes are reflected in this note.                  "

## 2024-05-29 ENCOUNTER — ANESTHESIA (OUTPATIENT)
Dept: SURGERY | Facility: AMBULATORY SURGERY CENTER | Age: 44
End: 2024-05-29
Payer: COMMERCIAL

## 2024-05-29 ENCOUNTER — HOSPITAL ENCOUNTER (OUTPATIENT)
Facility: AMBULATORY SURGERY CENTER | Age: 44
Discharge: HOME | End: 2024-05-29
Attending: OTOLARYNGOLOGY | Admitting: OTOLARYNGOLOGY
Payer: COMMERCIAL

## 2024-05-29 VITALS
TEMPERATURE: 97.7 F | WEIGHT: 156 LBS | SYSTOLIC BLOOD PRESSURE: 120 MMHG | OXYGEN SATURATION: 98 % | BODY MASS INDEX: 27.85 KG/M2 | DIASTOLIC BLOOD PRESSURE: 95 MMHG | RESPIRATION RATE: 15 BRPM | HEART RATE: 78 BPM

## 2024-05-29 DIAGNOSIS — Z90.89 S/P TONSILLECTOMY: Primary | ICD-10-CM

## 2024-05-29 PROCEDURE — 42826 REMOVAL OF TONSILS: CPT | Performed by: OTOLARYNGOLOGY

## 2024-05-29 PROCEDURE — 42826 REMOVAL OF TONSILS: CPT | Performed by: NURSE ANESTHETIST, CERTIFIED REGISTERED

## 2024-05-29 PROCEDURE — 42826 REMOVAL OF TONSILS: CPT | Performed by: ANESTHESIOLOGY

## 2024-05-29 PROCEDURE — 42826 REMOVAL OF TONSILS: CPT

## 2024-05-29 PROCEDURE — 88304 TISSUE EXAM BY PATHOLOGIST: CPT | Performed by: STUDENT IN AN ORGANIZED HEALTH CARE EDUCATION/TRAINING PROGRAM

## 2024-05-29 PROCEDURE — G8918 PT W/O PREOP ORDER IV AB PRO: HCPCS

## 2024-05-29 PROCEDURE — G8907 PT DOC NO EVENTS ON DISCHARG: HCPCS

## 2024-05-29 RX ORDER — OXYCODONE HCL 5 MG/5 ML
5 SOLUTION, ORAL ORAL EVERY 6 HOURS PRN
Qty: 60 ML | Refills: 0 | Status: SHIPPED | OUTPATIENT
Start: 2024-05-29 | End: 2024-06-01

## 2024-05-29 RX ORDER — LIDOCAINE 40 MG/G
CREAM TOPICAL
Status: DISCONTINUED | OUTPATIENT
Start: 2024-05-29 | End: 2024-05-30 | Stop reason: HOSPADM

## 2024-05-29 RX ORDER — AMOXICILLIN 400 MG/5ML
600 POWDER, FOR SUSPENSION ORAL 2 TIMES DAILY
Qty: 150 ML | Refills: 0 | Status: SHIPPED | OUTPATIENT
Start: 2024-05-29 | End: 2024-06-08

## 2024-05-29 RX ORDER — DEXAMETHASONE SODIUM PHOSPHATE 4 MG/ML
4 INJECTION, SOLUTION INTRA-ARTICULAR; INTRALESIONAL; INTRAMUSCULAR; INTRAVENOUS; SOFT TISSUE
Status: DISCONTINUED | OUTPATIENT
Start: 2024-05-29 | End: 2024-05-30 | Stop reason: HOSPADM

## 2024-05-29 RX ORDER — ONDANSETRON 4 MG/1
4 TABLET, ORALLY DISINTEGRATING ORAL EVERY 30 MIN PRN
Status: DISCONTINUED | OUTPATIENT
Start: 2024-05-29 | End: 2024-05-30 | Stop reason: HOSPADM

## 2024-05-29 RX ORDER — LIDOCAINE HYDROCHLORIDE AND EPINEPHRINE 10; 10 MG/ML; UG/ML
INJECTION, SOLUTION INFILTRATION; PERINEURAL PRN
Status: DISCONTINUED | OUTPATIENT
Start: 2024-05-29 | End: 2024-05-29 | Stop reason: HOSPADM

## 2024-05-29 RX ORDER — OXYCODONE HYDROCHLORIDE 5 MG/1
10 TABLET ORAL ONCE
Status: COMPLETED | OUTPATIENT
Start: 2024-05-29 | End: 2024-05-29

## 2024-05-29 RX ORDER — DEXAMETHASONE SODIUM PHOSPHATE 4 MG/ML
INJECTION, SOLUTION INTRA-ARTICULAR; INTRALESIONAL; INTRAMUSCULAR; INTRAVENOUS; SOFT TISSUE PRN
Status: DISCONTINUED | OUTPATIENT
Start: 2024-05-29 | End: 2024-05-29

## 2024-05-29 RX ORDER — FENTANYL CITRATE 50 UG/ML
25 INJECTION, SOLUTION INTRAMUSCULAR; INTRAVENOUS EVERY 5 MIN PRN
Status: DISCONTINUED | OUTPATIENT
Start: 2024-05-29 | End: 2024-05-30 | Stop reason: HOSPADM

## 2024-05-29 RX ORDER — FENTANYL CITRATE 50 UG/ML
50 INJECTION, SOLUTION INTRAMUSCULAR; INTRAVENOUS EVERY 5 MIN PRN
Status: DISCONTINUED | OUTPATIENT
Start: 2024-05-29 | End: 2024-05-30 | Stop reason: HOSPADM

## 2024-05-29 RX ORDER — PROPOFOL 10 MG/ML
INJECTION, EMULSION INTRAVENOUS CONTINUOUS PRN
Status: DISCONTINUED | OUTPATIENT
Start: 2024-05-29 | End: 2024-05-29

## 2024-05-29 RX ORDER — LIDOCAINE HYDROCHLORIDE 20 MG/ML
INJECTION, SOLUTION INFILTRATION; PERINEURAL PRN
Status: DISCONTINUED | OUTPATIENT
Start: 2024-05-29 | End: 2024-05-29

## 2024-05-29 RX ORDER — ACETAMINOPHEN 325 MG/1
975 TABLET ORAL ONCE
Status: COMPLETED | OUTPATIENT
Start: 2024-05-29 | End: 2024-05-29

## 2024-05-29 RX ORDER — FENTANYL CITRATE 50 UG/ML
INJECTION, SOLUTION INTRAMUSCULAR; INTRAVENOUS PRN
Status: DISCONTINUED | OUTPATIENT
Start: 2024-05-29 | End: 2024-05-29

## 2024-05-29 RX ORDER — ONDANSETRON 2 MG/ML
4 INJECTION INTRAMUSCULAR; INTRAVENOUS EVERY 30 MIN PRN
Status: DISCONTINUED | OUTPATIENT
Start: 2024-05-29 | End: 2024-05-30 | Stop reason: HOSPADM

## 2024-05-29 RX ORDER — SODIUM CHLORIDE, SODIUM LACTATE, POTASSIUM CHLORIDE, CALCIUM CHLORIDE 600; 310; 30; 20 MG/100ML; MG/100ML; MG/100ML; MG/100ML
INJECTION, SOLUTION INTRAVENOUS CONTINUOUS
Status: DISCONTINUED | OUTPATIENT
Start: 2024-05-29 | End: 2024-05-30 | Stop reason: HOSPADM

## 2024-05-29 RX ORDER — ONDANSETRON 2 MG/ML
INJECTION INTRAMUSCULAR; INTRAVENOUS PRN
Status: DISCONTINUED | OUTPATIENT
Start: 2024-05-29 | End: 2024-05-29

## 2024-05-29 RX ORDER — PROPOFOL 10 MG/ML
INJECTION, EMULSION INTRAVENOUS PRN
Status: DISCONTINUED | OUTPATIENT
Start: 2024-05-29 | End: 2024-05-29

## 2024-05-29 RX ORDER — NALOXONE HYDROCHLORIDE 0.4 MG/ML
0.1 INJECTION, SOLUTION INTRAMUSCULAR; INTRAVENOUS; SUBCUTANEOUS
Status: DISCONTINUED | OUTPATIENT
Start: 2024-05-29 | End: 2024-05-30 | Stop reason: HOSPADM

## 2024-05-29 RX ADMIN — FENTANYL CITRATE 50 MCG: 50 INJECTION, SOLUTION INTRAMUSCULAR; INTRAVENOUS at 12:28

## 2024-05-29 RX ADMIN — SODIUM CHLORIDE, SODIUM LACTATE, POTASSIUM CHLORIDE, CALCIUM CHLORIDE: 600; 310; 30; 20 INJECTION, SOLUTION INTRAVENOUS at 12:20

## 2024-05-29 RX ADMIN — ONDANSETRON 4 MG: 2 INJECTION INTRAMUSCULAR; INTRAVENOUS at 12:37

## 2024-05-29 RX ADMIN — LIDOCAINE HYDROCHLORIDE 80 MG: 20 INJECTION, SOLUTION INFILTRATION; PERINEURAL at 12:29

## 2024-05-29 RX ADMIN — FENTANYL CITRATE 25 MCG: 50 INJECTION, SOLUTION INTRAMUSCULAR; INTRAVENOUS at 13:21

## 2024-05-29 RX ADMIN — FENTANYL CITRATE 50 MCG: 50 INJECTION, SOLUTION INTRAMUSCULAR; INTRAVENOUS at 12:39

## 2024-05-29 RX ADMIN — Medication 50 MG: at 12:29

## 2024-05-29 RX ADMIN — ACETAMINOPHEN 975 MG: 325 TABLET ORAL at 12:14

## 2024-05-29 RX ADMIN — DEXAMETHASONE SODIUM PHOSPHATE 10 MG: 4 INJECTION, SOLUTION INTRA-ARTICULAR; INTRALESIONAL; INTRAMUSCULAR; INTRAVENOUS; SOFT TISSUE at 12:37

## 2024-05-29 RX ADMIN — PROPOFOL 200 MCG/KG/MIN: 10 INJECTION, EMULSION INTRAVENOUS at 12:29

## 2024-05-29 RX ADMIN — PROPOFOL 150 MG: 10 INJECTION, EMULSION INTRAVENOUS at 12:29

## 2024-05-29 RX ADMIN — OXYCODONE HYDROCHLORIDE 10 MG: 5 TABLET ORAL at 13:11

## 2024-05-29 RX ADMIN — FENTANYL CITRATE 25 MCG: 50 INJECTION, SOLUTION INTRAMUSCULAR; INTRAVENOUS at 13:13

## 2024-05-29 RX ADMIN — Medication 0.5 MG: at 12:40

## 2024-05-29 NOTE — OP NOTE
Name: John Preciado   MR#: [id number]   : 1980   Procedure date: 2024  Surgeon:    Sara Chapman MD.  Preoperative diagnosis:  1. Chronic Tonsillitis      2. Recurrent Tonsillitis  Postoperaive Diagnosis:  1. chronic tonsillitis      2. Recurrent Tonsillitis  Procedure performed:   Tonsillectomy; Bilateral  Anesthesia:    General Endotracheal anesthesia  Estimated Blood Loss:  3 ml.  Complications:    None  Specimen:   Bilateral palatine tonsils  Findings:    Inflamed and scarred tonsils bilaterally and significant hypertrophy of the tonsils.  Disposition:   To the postanesthesia care unit in stable condition.    Procedure in detail:   The patient was identified in the preoperative area and after getting the informed consent, the patient was transferred to the operating room, placed on the operating table in supine position. She was then endotracheally intubated by anesthesia without difficulty. Bed was then turned and she was prepped and draped appropriately. A McIvor mouth probe was placed into the mouth in order to elevate the tongue to visualize the tonsils. It was clamped to the Hu stent in normal manner. The right to the tonsil was then retracted medially and inferiorly using Allis clamp. The tonsil freer was used to free the superior pole of the tonsil from the underlying tonsillar fossa. The dissection and bleeding control were carried out until the entire tonsil was removed by coblator on a setting of 3 for coagulation, 7 for ablation.. There was significant vascularities of the tonsillary bed as well as some dominant scar in areas. After removal of the right tonsil, hemostasis was maintained by suction cautery. The left tonsil was then retracted medially and inferiorly using the Allis clamp. Veteran was used to remove the tonsil from its  bed. The dissection was carried out until the entire tonsil was removed. Hemostasis was again maintained with coblator on a setting of 3 for  coagulation, 7 for ablation.  After removal of the tonsils, the oral cavity was irrigated with normal saline and there was no active bleeding with good hemostasis. The patient was then turned back to anesthesia and extubated in the operating room without complications and transferred to postanesthesia care unit in stable condition.

## 2024-05-29 NOTE — ANESTHESIA CARE TRANSFER NOTE
Patient: John Preciado    Procedure: Procedure(s):  TONSILLECTOMY       Diagnosis: Tonsil stone [J35.8]  Chronic tonsillitis [J35.01]  Diagnosis Additional Information: No value filed.    Anesthesia Type:   General     Note:      Level of Consciousness: awake  Oxygen Supplementation: face mask  Level of Supplemental Oxygen (L/min / FiO2): 5  Independent Airway: airway patency satisfactory and stable  Dentition: dentition unchanged  Vital Signs Stable: post-procedure vital signs reviewed and stable  Report to RN Given: handoff report given  Patient transferred to: PACU    Handoff Report: Identifed the Patient, Identified the Reponsible Provider, Reviewed the pertinent medical history, Discussed the surgical course, Reviewed Intra-OP anesthesia mangement and issues during anesthesia, Set expectations for post-procedure period and Allowed opportunity for questions and acknowledgement of understanding      Vitals:  Vitals Value Taken Time   BP     Temp     Pulse 106 05/29/24 1304   Resp 31 05/29/24 1304   SpO2     Vitals shown include unfiled device data.    Electronically Signed By: SARTHAK Simmons CRNA  May 29, 2024  1:05 PM

## 2024-05-29 NOTE — DISCHARGE INSTRUCTIONS
Tylenol 975 mg was given at 12:14 PM.  You should not take more then 4,000 mg of tylenol/acetaminophen in a 24 hour period.      To Contact Dr Chapman or On- Call MD    Call Daytime Business hours:  [phone]    After hours:  On Call Resident (ENT)  [phone]    Dr. Chapman Cell Phone Number  [phone]      Postoperative Care for Tonsillectomy (with or without adenoidectomy)    Recovery - There are a handful of issues that routinely occur during recover that should be anticipated during your recovery.    The pain and swelling almost always gets worse before it gets better, this is normal.  Usually it peaks 3 to 5 days after the surgery, and then begins improving at 7 to 8 days after surgery.  Of course, this is variable from person to person.  The only dietary restriction is avoidance of hard or crunchy things until I see you in follow up.  If it makes a noise when you bite it, it is too hard.  Although it is good to begin eating again from day one, it is not unusual to not eat for several days after the procedure.  The most important thing is staying hydrated.  Drink fluids with electrolytes if possible, such as sports drinks.  The pain medication you were sent home with can make some people very nauseated.  To minimize this, avoid taking it on an empty stomach, or take smaller does with greater frequency.  For example if your dose is 2 teaspoons every four hours, try taking one teaspoon every two hours, etc.  Antibiotic are sometimes given after surgery, not to prevent infection, but some research shows that it helps to decrease pain.  This is not absolutely proven, and therefore is not absolutely necessary.   Try to stay ahead of the pain.  In other words, do not wait for pain medication to completely wear off before taking more pain medicine.  Instead, take the medication every 4 to 6 hours, even if it requires setting an alarm clock at night.  This is especially helpful during the first 5 days.  The  uvula ( the small hanging object in the back of your mouth) frequently swells up after tonsillectomy, but will go back to normal.  This swelling can temporarily cause the sensation of something being stuck in your throat, it will go away with recovery.  Also, because of the arrangement of nerves under where the tonsils were, sharp ear pain is very common during recovery, and will also go away with recovery.   With adenoidectomy, a very strong and foul-smelling odor can occur about 4-7 days after surgery.  This fades rapidly, and unless there is an associated fever no antibiotics are necessary.  It is very common after tonsillectomy to experience ear pain. This is due to nerves on the side of the throat becoming inflamed, and causing the perception of sudden episodes of ear pain.  This can be controlled with the same pain medication given for the surgery.     Activity - Avoid heavy lifting (greater than 20 pounds), strenuous exercise, or extremely cold environments until the follow up appointment.  Also, try to sleep with your head elevated.  An irritated cough from the breathing tube is fairly normal after surgery.    Medications - Except blood thinners, almost all medication can be re-started after tonsillectomy.      Complications - Bleeding is by far the most common complication after tonsillectomy.  If there are a few small drops or streaks of blood in the saliva that then goes away, this can be conservatively watched.  Gentle gargling with the ice water can also help stop this minor bleeding.  However, if the bleeding is persistent, or heavy bleeding occurs, do not hesitate.  Go to the emergency room to be evaluated.    Follow up - I like to see my patients about 2 weeks after the procedure to make sure that everything is healing appropriately.  Occasionally, there can be some longer - lasting side effects of surgery such as abnormal tongue sensations, or unusual swallowing.  However, if everything is healing  well, the 2 week postoperative visit is all that will be necessary.      ------------------------------------------    What can I eat?    The day of surgery, give only cool, Clear liquids such as:    Apple Juice  Jell-o*  Bob-aid*  Popsicles*  Flat pop (remove bubbles)  Water    If you have an upset stomach, give small amounts often. Note: If   You vomits after drinking red liquids the vomit will be the same  color.    After the first day, when you want them add dairy and soft foods such as:  Ice cream  Milk shakes(use spoon)  Pudding  Smooth yogurt  Liquids are more important than food.    Be sure you are drinking a lot.    Add soft foods (foods without rough edges). See the chart for ideas. If a food is not on the list ask yourself:    Is it easy to chew? Is it free of coarse, rough, or crispy edges?  If the answer  is yes, you can probably eat it.    Be sure to cut foods very small and chew them well. Continue the soft diet for 2 weeks after surgery Avoid citrus fruits and juices   such as orange juice and lemonade, as they may sting your throat. Avoid foods that are hot in temperature or spicy hot.        May Eat Should not eat   - Soft bread  - Soggy waffles or   Nepali toast (no crusts).  Soaked in syrup  - Pancakes  - Scrambled or   poached egg   - Toast  - Crispy waffles  - Fried foods   - Oatmeal,or   Creamy cereal  - Soggy cold cereal  (soaked in milk   - Crunchy cold   cereal   - Soup  - Hot dogs  - Hamburgers  - Tender, moist  meat  - Pasta, noodles  - Spaghetti-Os  - Macaroni and  Cheese   - Tough, dry meat,  chicken or fish   - Milk  - Custard, pudding  - Ice cream  - Malts, shakes  - Yogurt (smooth)  - Cottage cheese   - Cookies  - Crackers  - Pretzels  - Chips  - Popcorn  - Nuts   - Sandwiches, (no crusts)  - Smooth peanut butter   and jelly  - Processed cheese  - Tuna - Grilled cheese  sandwiches   - Cooked vegetables  - Mashed potatoes - Raw vegetables   - Tomatoes   - Applesauce  - Bananas  -  Canned fruits  - Watermelon with out  seeds - Citrus fruits  - Moist fresh fruits   - Juices (not citrus)  - Bob aid  - Flat pop (no bubbles)  - Jell-O - Citrus juices  - Pop with bubbles

## 2024-05-29 NOTE — ANESTHESIA POSTPROCEDURE EVALUATION
Patient: John Preciado    Procedure: Procedure(s):  TONSILLECTOMY       Anesthesia Type:  General    Note:  Disposition: Outpatient   Postop Pain Control: Uneventful            Sign Out: Well controlled pain   PONV: No   Neuro/Psych: Uneventful            Sign Out: Acceptable/Baseline neuro status   Airway/Respiratory: Uneventful            Sign Out: Acceptable/Baseline resp. status   CV/Hemodynamics: Uneventful            Sign Out: Acceptable CV status; No obvious hypovolemia; No obvious fluid overload   Other NRE: NONE   DID A NON-ROUTINE EVENT OCCUR?            Last vitals:  Vitals Value Taken Time   BP 99/77 05/29/24 1304   Temp 97.7  F (36.5  C) 05/29/24 1304   Pulse 107 05/29/24 1308   Resp 17 05/29/24 1308   SpO2 100 % 05/29/24 1308   Vitals shown include unfiled device data.    Electronically Signed By: Bart Dalton MD  May 29, 2024  2:50 PM

## 2024-05-31 LAB
PATH REPORT.COMMENTS IMP SPEC: NORMAL
PATH REPORT.COMMENTS IMP SPEC: NORMAL
PATH REPORT.FINAL DX SPEC: NORMAL
PATH REPORT.GROSS SPEC: NORMAL
PATH REPORT.MICROSCOPIC SPEC OTHER STN: NORMAL
PATH REPORT.RELEVANT HX SPEC: NORMAL
PHOTO IMAGE: NORMAL

## 2024-06-13 ASSESSMENT — ENCOUNTER SYMPTOMS
CONSTITUTIONAL NEGATIVE: 1
RESPIRATORY NEGATIVE: 1
EYES NEGATIVE: 1

## 2024-06-13 NOTE — PROGRESS NOTES
Chief Complaint   Patient presents with    Surgical Followup     3 week S/P TONSILLECTOMY DOS  5-29-24  Apnea much improved.       PCP: Vikram Perrin     Referring Provider: No ref. provider found    LMP 05/03/2024 (Approximate)     ENT Problem List:  Patient Active Problem List   Diagnosis    Tonsil stone    Chronic tonsillitis    Recurrent major depressive disorder, remission status unspecified (H24)      Current Medications:  Current Outpatient Medications   Medication Sig Dispense Refill    insulin pen needle (32G X 4 MM) 32G X 4 MM miscellaneous Use 1 pen needles daily or as directed. 90 each 1    loratadine (CLARITIN) 10 MG tablet Take 10 mg by mouth daily      melatonin 3 MG tablet Take 3 mg by mouth nightly as needed for sleep      metoprolol tartrate (LOPRESSOR) 25 MG tablet Take 1 tablet (25 mg) by mouth 2 times daily 120 tablet 1    SUMAtriptan (IMITREX) 25 MG tablet Take 1 tablet (25 mg) by mouth at onset of headache for migraine May repeat in 2 hours. Max 8 tablets/24 hours. 30 tablet 1    Vitamin D3 (VITAMIN D, CHOLECALCIFEROL,) 25 mcg (1000 units) tablet Take by mouth daily      WEGOVY 1.7 MG/0.75ML pen INJECT 1.7 MG UNDER THE SKIN ONCE A WEEK 3 mL 3     No current facility-administered medications for this visit.     HPI  Pleasant 44 year old female presents today as a(n) established patient for 3 week s/p tonsillectomy DOS 5/29/24. She reports much improvement with her sleep apnea. She reports swelling in her tongue and throat after surgery that has since resolved. She states that she had some pain at the surgical site and referred pain to her ears, but it eventually subsided.      Review of Systems   Constitutional: Negative.    Eyes: Negative.    Respiratory: Negative.     Gastrointestinal: Negative.    Skin: Negative.    Endo/Heme/Allergies: Negative.        Physical Exam  Vitals and nursing note reviewed.   Constitutional:       Appearance: Normal appearance.   HENT:      Head: Normocephalic  and atraumatic.      Jaw: There is normal jaw occlusion.      Right Ear: Hearing, tympanic membrane and ear canal normal.      Left Ear: Hearing, tympanic membrane and ear canal normal.      Nose: No mucosal edema, congestion or rhinorrhea.      Right Nostril: No occlusion.      Left Nostril: No occlusion.      Right Turbinates: Not enlarged or swollen.      Left Turbinates: Not enlarged or swollen.      Right Sinus: No maxillary sinus tenderness or frontal sinus tenderness.      Left Sinus: No maxillary sinus tenderness or frontal sinus tenderness.      Mouth/Throat:      Mouth: Mucous membranes are moist.      Pharynx: Oropharynx is clear. Uvula midline.   Eyes:      Extraocular Movements: Extraocular movements intact.      Pupils: Pupils are equal, round, and reactive to light.   Neurological:      Mental Status: She is alert.       A/P  This pleasant patient is overall recovering well 3 week s/p tonsillectomy DOS 5/29/24. There are no ear, sinus, or throat infections present. She is informed that there are no further dietary restrictions, and is advised to get good hydration. Questions and concerns were addressed.    Follow up in clinic in prn.    Scribe/Staff:    Scribe Disclosure:   I, Myrna Nieves, am serving as a scribe; to document services personally performed by Sara Chapman MD based on data collection and the provider's statements to me.     Provider Disclosure:  I agree with above History, Review of Systems, Physical exam and Plan.  I have reviewed the content of the documentation and have edited it as needed. I have personally performed the services documented here and the documentation accurately represents those services and the decisions I have made.      Electronically signed by:  Sara Chapman MD

## 2024-06-21 ENCOUNTER — OFFICE VISIT (OUTPATIENT)
Dept: OTOLARYNGOLOGY | Facility: CLINIC | Age: 44
End: 2024-06-21
Payer: COMMERCIAL

## 2024-06-21 DIAGNOSIS — Z90.89 S/P TONSILLECTOMY: Primary | ICD-10-CM

## 2024-06-21 PROCEDURE — 99024 POSTOP FOLLOW-UP VISIT: CPT | Performed by: OTOLARYNGOLOGY

## 2024-06-21 ASSESSMENT — ENCOUNTER SYMPTOMS: GASTROINTESTINAL NEGATIVE: 1

## 2024-06-21 NOTE — LETTER
6/21/2024      John Preciado  [address]  [place] [address]      Dear Colleague,    Thank you for referring your patient, John Preciado, to the [facility]. Please see a copy of my visit note below.    Chief Complaint   Patient presents with     Surgical Followup     3 week S/P TONSILLECTOMY DOS  5-29-24  Apnea much improved.       PCP: Vikram Perrin     Referring Provider: No ref. provider found    LMP 05/03/2024 (Approximate)     ENT Problem List:  Patient Active Problem List   Diagnosis     Tonsil stone     Chronic tonsillitis     Recurrent major depressive disorder, remission status unspecified (H24)      Current Medications:  Current Outpatient Medications   Medication Sig Dispense Refill     insulin pen needle (32G X 4 MM) 32G X 4 MM miscellaneous Use 1 pen needles daily or as directed. 90 each 1     loratadine (CLARITIN) 10 MG tablet Take 10 mg by mouth daily       melatonin 3 MG tablet Take 3 mg by mouth nightly as needed for sleep       metoprolol tartrate (LOPRESSOR) 25 MG tablet Take 1 tablet (25 mg) by mouth 2 times daily 120 tablet 1     SUMAtriptan (IMITREX) 25 MG tablet Take 1 tablet (25 mg) by mouth at onset of headache for migraine May repeat in 2 hours. Max 8 tablets/24 hours. 30 tablet 1     Vitamin D3 (VITAMIN D, CHOLECALCIFEROL,) 25 mcg (1000 units) tablet Take by mouth daily       WEGOVY 1.7 MG/0.75ML pen INJECT 1.7 MG UNDER THE SKIN ONCE A WEEK 3 mL 3     No current facility-administered medications for this visit.     HPI  Pleasant 44 year old female presents today as a(n) established patient for 3 week s/p tonsillectomy DOS 5/29/24. She reports much improvement with her sleep apnea. She reports swelling in her tongue and throat after surgery that has since resolved. She states that she had some pain at the surgical site and referred pain to her ears, but it eventually subsided.      Review of Systems   Constitutional: Negative.    Eyes:  Negative.    Respiratory: Negative.     Gastrointestinal: Negative.    Skin: Negative.    Endo/Heme/Allergies: Negative.        Physical Exam  Vitals and nursing note reviewed.   Constitutional:       Appearance: Normal appearance.   HENT:      Head: Normocephalic and atraumatic.      Jaw: There is normal jaw occlusion.      Right Ear: Hearing, tympanic membrane and ear canal normal.      Left Ear: Hearing, tympanic membrane and ear canal normal.      Nose: No mucosal edema, congestion or rhinorrhea.      Right Nostril: No occlusion.      Left Nostril: No occlusion.      Right Turbinates: Not enlarged or swollen.      Left Turbinates: Not enlarged or swollen.      Right Sinus: No maxillary sinus tenderness or frontal sinus tenderness.      Left Sinus: No maxillary sinus tenderness or frontal sinus tenderness.      Mouth/Throat:      Mouth: Mucous membranes are moist.      Pharynx: Oropharynx is clear. Uvula midline.   Eyes:      Extraocular Movements: Extraocular movements intact.      Pupils: Pupils are equal, round, and reactive to light.   Neurological:      Mental Status: She is alert.       A/P  This pleasant patient is overall recovering well 3 week s/p tonsillectomy DOS 5/29/24. There are no ear, sinus, or throat infections present. She is informed that there are no further dietary restrictions, and is advised to get good hydration. Questions and concerns were addressed.    Follow up in clinic in prn.    Scribe/Staff:    Scribe Disclosure:   I, Myrna Nieves, am serving as a scribe; to document services personally performed by Sara Chapman MD based on data collection and the provider's statements to me.     Provider Disclosure:  I agree with above History, Review of Systems, Physical exam and Plan.  I have reviewed the content of the documentation and have edited it as needed. I have personally performed the services documented here and the documentation accurately represents those services and the  decisions I have made.      Electronically signed by:  Sara Chapman MD         Again, thank you for allowing me to participate in the care of your patient.        Sincerely,        Sara Chapman MD

## 2024-06-21 NOTE — NURSING NOTE
John Preciado's chief complaint for this visit includes:  Chief Complaint   Patient presents with    Surgical Followup     3 week S/P TONSILLECTOMY DOS  5-29-24  Apnea much improved.      PCP: Vikram Perrin    Referring Provider:  Vikram Perrin MD  [address] [place]  [place] [address]    [facility] 05/03/2024 (Approximate)

## 2024-07-19 ENCOUNTER — PATIENT OUTREACH (OUTPATIENT)
Dept: CARE COORDINATION | Facility: CLINIC | Age: 44
End: 2024-07-19
Payer: COMMERCIAL

## 2024-08-02 ENCOUNTER — PATIENT OUTREACH (OUTPATIENT)
Dept: CARE COORDINATION | Facility: CLINIC | Age: 44
End: 2024-08-02
Payer: COMMERCIAL

## 2024-09-27 ENCOUNTER — OFFICE VISIT (OUTPATIENT)
Dept: FAMILY MEDICINE | Facility: CLINIC | Age: 44
End: 2024-09-27
Payer: COMMERCIAL

## 2024-09-27 VITALS
HEART RATE: 95 BPM | TEMPERATURE: 98.7 F | SYSTOLIC BLOOD PRESSURE: 120 MMHG | OXYGEN SATURATION: 98 % | DIASTOLIC BLOOD PRESSURE: 58 MMHG | BODY MASS INDEX: 26.31 KG/M2 | WEIGHT: 143 LBS | RESPIRATION RATE: 20 BRPM | HEIGHT: 62 IN

## 2024-09-27 DIAGNOSIS — E66.01 MORBID OBESITY (H): ICD-10-CM

## 2024-09-27 DIAGNOSIS — G43.809 OTHER MIGRAINE WITHOUT STATUS MIGRAINOSUS, NOT INTRACTABLE: ICD-10-CM

## 2024-09-27 DIAGNOSIS — Z00.00 ROUTINE HISTORY AND PHYSICAL EXAMINATION OF ADULT: Primary | ICD-10-CM

## 2024-09-27 PROCEDURE — 90746 HEPB VACCINE 3 DOSE ADULT IM: CPT | Performed by: FAMILY MEDICINE

## 2024-09-27 PROCEDURE — 90472 IMMUNIZATION ADMIN EACH ADD: CPT | Performed by: FAMILY MEDICINE

## 2024-09-27 PROCEDURE — 99214 OFFICE O/P EST MOD 30 MIN: CPT | Mod: 25 | Performed by: FAMILY MEDICINE

## 2024-09-27 PROCEDURE — 90677 PCV20 VACCINE IM: CPT | Performed by: FAMILY MEDICINE

## 2024-09-27 PROCEDURE — 90471 IMMUNIZATION ADMIN: CPT | Performed by: FAMILY MEDICINE

## 2024-09-27 PROCEDURE — 91320 SARSCV2 VAC 30MCG TRS-SUC IM: CPT | Performed by: FAMILY MEDICINE

## 2024-09-27 PROCEDURE — 90480 ADMN SARSCOV2 VAC 1/ONLY CMP: CPT | Performed by: FAMILY MEDICINE

## 2024-09-27 PROCEDURE — 99396 PREV VISIT EST AGE 40-64: CPT | Performed by: FAMILY MEDICINE

## 2024-09-27 PROCEDURE — 90656 IIV3 VACC NO PRSV 0.5 ML IM: CPT | Performed by: FAMILY MEDICINE

## 2024-09-27 RX ORDER — METOPROLOL TARTRATE 25 MG/1
25 TABLET, FILM COATED ORAL 2 TIMES DAILY
Qty: 120 TABLET | Refills: 3 | Status: SHIPPED | OUTPATIENT
Start: 2024-09-27

## 2024-09-27 RX ORDER — SEMAGLUTIDE 1.7 MG/.75ML
1.7 INJECTION, SOLUTION SUBCUTANEOUS
Qty: 12 ML | Refills: 3 | Status: SHIPPED | OUTPATIENT
Start: 2024-09-27

## 2024-09-27 RX ORDER — SUMATRIPTAN 25 MG/1
25 TABLET, FILM COATED ORAL
Qty: 30 TABLET | Refills: 3 | Status: SHIPPED | OUTPATIENT
Start: 2024-09-27

## 2024-09-27 ASSESSMENT — PATIENT HEALTH QUESTIONNAIRE - PHQ9
SUM OF ALL RESPONSES TO PHQ QUESTIONS 1-9: 7
SUM OF ALL RESPONSES TO PHQ QUESTIONS 1-9: 7
10. IF YOU CHECKED OFF ANY PROBLEMS, HOW DIFFICULT HAVE THESE PROBLEMS MADE IT FOR YOU TO DO YOUR WORK, TAKE CARE OF THINGS AT HOME, OR GET ALONG WITH OTHER PEOPLE: SOMEWHAT DIFFICULT

## 2024-09-27 ASSESSMENT — PAIN SCALES - GENERAL: PAINLEVEL: NO PAIN (0)

## 2024-09-27 NOTE — PROGRESS NOTES
"Preventive Care Visit  [facility]  Vikram Perrin MD, Family Medicine  Sep 27, 2024      Assessment & Plan     Routine history and physical examination of adult  Medically doing well.  Recommended to continue regular exercise, healthy diet.    Other migraine without status migrainosus, not intractable  Symptoms stable.  Sumatriptan and metoprolol refilled  - SUMAtriptan (IMITREX) 25 MG tablet; Take 1 tablet (25 mg) by mouth at onset of headache for migraine. May repeat in 2 hours. Max 8 tablets/24 hours.  - metoprolol tartrate (LOPRESSOR) 25 MG tablet; Take 1 tablet (25 mg) by mouth 2 times daily.    Morbid obesity (H)  Patient lost > 60 pounds since started Wegovy.  Recommended to continue regular exercise and healthy diet.  Wegovy prescription refilled  - Semaglutide-Weight Management (WEGOVY) 1.7 MG/0.75ML pen; Inject 1.7 mg subcutaneously every 7 days.      BMI  Estimated body mass index is 25.9 kg/m  as calculated from the following:    Height as of this encounter: 1.582 m (5' 2.3\").    Weight as of this encounter: 64.9 kg (143 lb).   Weight management plan: Discussed healthy diet and exercise guidelines    Counseling  Appropriate preventive services were addressed with this patient via screening, questionnaire, or discussion as appropriate for fall prevention, nutrition, physical activity, Tobacco-use cessation, social engagement, weight loss and cognition.  Checklist reviewing preventive services available has been given to the patient.  Reviewed patient's diet, addressing concerns and/or questions.   The patient's PHQ-9 score is consistent with mild depression. She was provided with information regarding depression.         Summer Lopez is a 44 year old, presenting for the following:  Physical        9/27/2024     3:04 PM   Additional Questions   Roomed by Lyn AMBRIZ LPN   Accompanied by Self        Health Care Directive  Patient does not have a Health Care Directive or " Living Will: Patient states has Advance Directive and will bring in a copy to clinic.    HPI        9/27/2024   General Health   How would you rate your overall physical health? Good   Feel stress (tense, anxious, or unable to sleep) To some extent      (!) STRESS CONCERN      9/27/2024   Nutrition   Three or more servings of calcium each day? Yes   Diet: Regular (no restrictions)   How many servings of fruit and vegetables per day? (!) 2-3   How many sweetened beverages each day? 0-1            9/27/2024   Exercise   Days per week of moderate/strenous exercise 5 days   Average minutes spent exercising at this level 60 min            9/27/2024   Social Factors   Frequency of gathering with friends or relatives Twice a week   Worry food won't last until get money to buy more No   Food not last or not have enough money for food? No   Do you have housing? (Housing is defined as stable permanent housing and does not include staying ouside in a car, in a tent, in an abandoned building, in an overnight shelter, or couch-surfing.) Yes   Are you worried about losing your housing? No   Lack of transportation? No   Unable to get utilities (heat,electricity)? No            9/27/2024   Dental   Dentist two times every year? Yes            9/27/2024   TB Screening   Were you born outside of the [place]? No          Today's PHQ-9 Score:       9/27/2024     9:30 AM   PHQ-9 SCORE   PHQ-9 Total Score MyChart 7 (Mild depression)   PHQ-9 Total Score 7         9/27/2024   Substance Use   Alcohol more than 3/day or more than 7/wk Not Applicable   Do you use any other substances recreationally? (!) CANNABIS PRODUCTS        Social History     Tobacco Use    Smoking status: Never     Passive exposure: Never    Smokeless tobacco: Never   Vaping Use    Vaping status: Never Used   Substance Use Topics    Alcohol use: Yes     Comment: In a month, I might have drinks anywhere from 0 to 4 times.    Drug use: Not Currently     Types: Marijuana      Comment: I do not smoke weed, but I have used edibles during myperiod                2024   STI Screening   New sexual partner(s) since last STI/HIV test? No        History of Pap smear:         Latest Ref Rng & Units 2023     1:30 PM   PAP / HPV   PAP  Negative for Intraepithelial Lesion or Malignancy (NILM)    HPV 16 DNA Negative Negative    HPV 18 DNA Negative Negative    Other HR HPV Negative Negative      ASCVD Risk   The 10-year ASCVD risk score (Swapnil KELLY, et al., 2019) is: 0.3%    Values used to calculate the score:      Age: 44 years      Sex: Female      Is Non- : Yes      Diabetic: No      Tobacco smoker: No      Systolic Blood Pressure: 120 mmHg      Is BP treated: No      HDL Cholesterol: 72 mg/dL      Total Cholesterol: 205 mg/dL        2024   Contraception/Family Planning   Questions about contraception or family planning No           Reviewed and updated as needed this visit by Provider                    Past Medical History:   Diagnosis Date    Depressive disorder     I was prescribed Fluoxetine/Prozac, and I took it successfully for a handful of years. The only reason I stopped taking it was because  I haven't successfully been able to get in to see a Psychotherapist since before the pandemic.    Hypertension      Past Surgical History:   Procedure Laterality Date    TONSILLECTOMY Bilateral 2024    Procedure: TONSILLECTOMY;  Surgeon: Sara Chapman MD;  Location:  OR     OB History    Para Term  AB Living   1 0 0 0 0 0   SAB IAB Ectopic Multiple Live Births   0 0 0 0 0      # Outcome Date GA Lbr James/2nd Weight Sex Type Anes PTL Lv   1               Lab work is in process  Labs reviewed in EPIC  BP Readings from Last 3 Encounters:   24 120/58   24 (!) 120/95   24 104/80    Wt Readings from Last 3 Encounters:   24 64.9 kg (143 lb)   24 70.8 kg (156 lb)   24 70.8 kg (156 lb)                   Patient Active Problem List   Diagnosis    Tonsil stone    Chronic tonsillitis    Recurrent major depressive disorder, remission status unspecified (H)     Past Surgical History:   Procedure Laterality Date    TONSILLECTOMY Bilateral 5/29/2024    Procedure: TONSILLECTOMY;  Surgeon: Sara Chapman MD;  Location:  OR       Social History     Tobacco Use    Smoking status: Never     Passive exposure: Never    Smokeless tobacco: Never   Substance Use Topics    Alcohol use: Yes     Comment: In a month, I might have drinks anywhere from 0 to 4 times.     Family History   Problem Relation Age of Onset    Other Cancer Mother         Skin cancer    Depression Mother     Anxiety Disorder Mother     Mental Illness Mother     Diabetes Father     Hypertension Father     Depression Father     Anxiety Disorder Father     Mental Illness Father     Obesity Father     Breast Cancer Maternal Grandmother     Mental Illness Maternal Grandmother     Diabetes Paternal Grandmother     Hypertension Paternal Grandmother     Anxiety Disorder Paternal Grandmother     Obesity Paternal Grandmother     Depression Brother     Anxiety Disorder Brother     Mental Illness Brother          Current Outpatient Medications   Medication Sig Dispense Refill    insulin pen needle (32G X 4 MM) 32G X 4 MM miscellaneous Use 1 pen needles daily or as directed. 90 each 1    loratadine (CLARITIN) 10 MG tablet Take 10 mg by mouth daily      melatonin 3 MG tablet Take 3 mg by mouth nightly as needed for sleep      metoprolol tartrate (LOPRESSOR) 25 MG tablet Take 1 tablet (25 mg) by mouth 2 times daily 120 tablet 1    SUMAtriptan (IMITREX) 25 MG tablet Take 1 tablet (25 mg) by mouth at onset of headache for migraine May repeat in 2 hours. Max 8 tablets/24 hours. 30 tablet 1    Vitamin D3 (VITAMIN D, CHOLECALCIFEROL,) 25 mcg (1000 units) tablet Take by mouth daily      WEGOVY 1.7 MG/0.75ML pen INJECT 1.7 MG UNDER THE SKIN ONCE A WEEK 3 mL 3  "    Allergies   Allergen Reactions    Seasonal Allergies Difficulty breathing and Other (See Comments)     Recent Labs   Lab Test 05/20/24  1001 08/18/23  1358   A1C  --  5.3   LDL  --  113*   HDL  --  72   TRIG  --  98   ALT  --  18   CR 0.77 0.83   GFRESTIMATED >90 90   POTASSIUM 3.8 3.8   TSH  --  0.73        Review of Systems  Constitutional, neuro, ENT, endocrine, pulmonary, cardiac, gastrointestinal, genitourinary, musculoskeletal, integument and psychiatric systems are negative, except as otherwise noted.     Objective    Exam  /58   Pulse 95   Temp 98.7  F (37.1  C) (Tympanic)   Resp 20   Ht 1.582 m (5' 2.3\")   Wt 64.9 kg (143 lb)   LMP 09/15/2024 (Exact Date)   SpO2 98%   BMI 25.90 kg/m     Estimated body mass index is 25.9 kg/m  as calculated from the following:    Height as of this encounter: 1.582 m (5' 2.3\").    Weight as of this encounter: 64.9 kg (143 lb).      Physical Exam  GENERAL: alert and no distress  EYES: Eyes grossly normal to inspection, PERRL and conjunctivae and sclerae normal  HENT: normal cephalic/atraumatic, nose and mouth without ulcers or lesions, oropharynx clear, and oral mucous membranes moist  NECK: no adenopathy, no asymmetry, masses, or scars  RESP: lungs clear to auscultation - no rales, rhonchi or wheezes  CV: regular rate and rhythm, normal S1 S2, no S3 or S4, no murmur, click or rub, no peripheral edema  ABDOMEN: soft, nontender, no hepatosplenomegaly, no masses  MS: no gross musculoskeletal defects noted, no edema  SKIN: no suspicious lesions or rashes  NEURO: Normal strength and tone, mentation intact and speech normal  PSYCH: mentation appears normal, affect normal/bright      Wt Readings from Last 10 Encounters:   09/27/24 64.9 kg (143 lb)   05/29/24 70.8 kg (156 lb)   05/20/24 70.8 kg (156 lb)   08/18/23 95.5 kg (210 lb 9.6 oz)        Signed Electronically by: Vikram Perrin MD    "

## 2024-09-30 ENCOUNTER — HOSPITAL ENCOUNTER (OUTPATIENT)
Dept: MAMMOGRAPHY | Facility: CLINIC | Age: 44
Discharge: HOME | End: 2024-09-30
Attending: FAMILY MEDICINE | Admitting: FAMILY MEDICINE
Payer: COMMERCIAL

## 2024-09-30 DIAGNOSIS — Z12.31 VISIT FOR SCREENING MAMMOGRAM: ICD-10-CM

## 2024-09-30 PROCEDURE — 77063 BREAST TOMOSYNTHESIS BI: CPT

## 2024-10-12 ENCOUNTER — OFFICE VISIT (OUTPATIENT)
Dept: URGENT CARE | Facility: URGENT CARE | Age: 44
End: 2024-10-12
Payer: COMMERCIAL

## 2024-10-12 ENCOUNTER — ANCILLARY PROCEDURE (OUTPATIENT)
Dept: GENERAL RADIOLOGY | Facility: CLINIC | Age: 44
End: 2024-10-12
Attending: FAMILY MEDICINE
Payer: COMMERCIAL

## 2024-10-12 VITALS
HEART RATE: 86 BPM | RESPIRATION RATE: 18 BRPM | OXYGEN SATURATION: 97 % | TEMPERATURE: 97.9 F | SYSTOLIC BLOOD PRESSURE: 97 MMHG | DIASTOLIC BLOOD PRESSURE: 68 MMHG

## 2024-10-12 DIAGNOSIS — S92.501A CLOSED FRACTURE OF PHALANX OF RIGHT FIFTH TOE, INITIAL ENCOUNTER: Primary | ICD-10-CM

## 2024-10-12 PROCEDURE — 99213 OFFICE O/P EST LOW 20 MIN: CPT | Performed by: FAMILY MEDICINE

## 2024-10-12 PROCEDURE — 73630 X-RAY EXAM OF FOOT: CPT | Mod: TC | Performed by: RADIOLOGY

## 2024-10-12 ASSESSMENT — PAIN SCALES - GENERAL: PAINLEVEL: EXTREME PAIN (8)

## 2024-10-12 NOTE — PROGRESS NOTES
Subjective: 2 days ago patient was dog sitting for a friend and stepped on the dog which caused her foot to get caught in the leg of a chair causing immediate pain, felt like she passed out from the pain initially, got swollen and bruised and she really could not bear any weight.  She got a boot which has made it easier to get around.    Objective: Large amount of swelling and pain around her right fifth toe but there is swelling below the third and fourth as well but the main pain seems to be around the toe.  We did an x-ray.  There is a toe fracture of the fifth proximal bone.    Assessment and plan: Right fifth toe fracture.  It should heal over time, probably take a total of about 6 weeks, already has a boot which is helpful and I think in a few days she will be able to hobble around pretty well on that.  I did give her an Ace wrap to wrap around to make it less susceptible to getting bumped.

## 2024-10-13 NOTE — RESULT ENCOUNTER NOTE
This is odd because the radiologist didn't mention the fracture we could see on the xray. I think they just missed seeing it.

## (undated) DEVICE — TUBING ESURG ENT SAL DISP 72290135

## (undated) DEVICE — PACK SET-UP STD 9102

## (undated) DEVICE — SOL NACL 0.9% INJ 1000ML BAG 07983-09

## (undated) DEVICE — SYR 10ML FINGER CONTROL W/O NDL 309695

## (undated) DEVICE — TUBING SUCTION 10'X3/16" N510

## (undated) DEVICE — ESU COBLATOR  EVAC 10" 70DEG XTRA W/CABLE EICA5872-01

## (undated) DEVICE — SPONGE TONSIL W/STRING MED

## (undated) DEVICE — NDL 25GA 1.5" 305127

## (undated) DEVICE — DRAPE SHEET HALF 40X60" 9358

## (undated) DEVICE — SUCTION TIP YANKAUER W/O VENT K86

## (undated) DEVICE — NDL 19GA 1.5"

## (undated) DEVICE — GLOVE BIOGEL PI ULTRATOUCH G SZ 7.5 42175

## (undated) DEVICE — SOL NACL 0.9% IRRIG 1000ML BOTTLE 07138-09

## (undated) DEVICE — GOWN XLG DISP 9545

## (undated) DEVICE — SYR EAR BULB 3OZ 0035830

## (undated) DEVICE — CONTAINER SPECIMEN 4OZ STERILE 17099

## (undated) DEVICE — CATH INTERMITTENT CLEAN-CATH 8FR 16" VINYL LF 421708

## (undated) DEVICE — SOL WATER IRRIG 1000ML BOTTLE 07139-09

## (undated) DEVICE — MARKER SKIN DOUBLE TIP W/FLEXI-RULER W/LABELS

## (undated) DEVICE — BOWL 32OZ STERILE 01232

## (undated) DEVICE — ANTIFOG SOLUTION W/FOAM PAD CF-1001

## (undated) RX ORDER — OXYCODONE HYDROCHLORIDE 5 MG/1
TABLET ORAL
Status: DISPENSED
Start: 2024-05-29

## (undated) RX ORDER — FENTANYL CITRATE 50 UG/ML
INJECTION, SOLUTION INTRAMUSCULAR; INTRAVENOUS
Status: DISPENSED
Start: 2024-05-29

## (undated) RX ORDER — ONDANSETRON 2 MG/ML
INJECTION INTRAMUSCULAR; INTRAVENOUS
Status: DISPENSED
Start: 2024-05-29

## (undated) RX ORDER — FENTANYL CITRATE 0.05 MG/ML
INJECTION, SOLUTION INTRAMUSCULAR; INTRAVENOUS
Status: DISPENSED
Start: 2024-05-29

## (undated) RX ORDER — DEXAMETHASONE SODIUM PHOSPHATE 4 MG/ML
INJECTION, SOLUTION INTRA-ARTICULAR; INTRALESIONAL; INTRAMUSCULAR; INTRAVENOUS; SOFT TISSUE
Status: DISPENSED
Start: 2024-05-29

## (undated) RX ORDER — ACETAMINOPHEN 325 MG/1
TABLET ORAL
Status: DISPENSED
Start: 2024-05-29

## (undated) RX ORDER — PROPOFOL 10 MG/ML
INJECTION, EMULSION INTRAVENOUS
Status: DISPENSED
Start: 2024-05-29